# Patient Record
Sex: FEMALE | Race: WHITE | NOT HISPANIC OR LATINO
[De-identification: names, ages, dates, MRNs, and addresses within clinical notes are randomized per-mention and may not be internally consistent; named-entity substitution may affect disease eponyms.]

---

## 2017-04-28 ENCOUNTER — APPOINTMENT (OUTPATIENT)
Dept: OBGYN | Facility: CLINIC | Age: 47
End: 2017-04-28

## 2017-06-21 ENCOUNTER — APPOINTMENT (OUTPATIENT)
Dept: INTERNAL MEDICINE | Facility: CLINIC | Age: 47
End: 2017-06-21

## 2017-06-21 VITALS
HEIGHT: 61 IN | SYSTOLIC BLOOD PRESSURE: 130 MMHG | HEART RATE: 72 BPM | DIASTOLIC BLOOD PRESSURE: 90 MMHG | TEMPERATURE: 98.9 F

## 2017-06-22 ENCOUNTER — CHART COPY (OUTPATIENT)
Age: 47
End: 2017-06-22

## 2017-06-22 LAB
ALBUMIN SERPL ELPH-MCNC: 4 G/DL
ALP BLD-CCNC: 58 U/L
ALT SERPL-CCNC: 12 U/L
AMYLASE/CREAT SERPL: 170 U/L
ANION GAP SERPL CALC-SCNC: 18 MMOL/L
AST SERPL-CCNC: 18 U/L
BASOPHILS # BLD AUTO: 0.07 K/UL
BASOPHILS NFR BLD AUTO: 1 %
BILIRUB SERPL-MCNC: 0.2 MG/DL
BUN SERPL-MCNC: 16 MG/DL
CALCIUM SERPL-MCNC: 9.9 MG/DL
CHLORIDE SERPL-SCNC: 100 MMOL/L
CO2 SERPL-SCNC: 21 MMOL/L
CREAT SERPL-MCNC: 0.96 MG/DL
EOSINOPHIL # BLD AUTO: 0.47 K/UL
EOSINOPHIL NFR BLD AUTO: 6.5 %
ERYTHROCYTE [SEDIMENTATION RATE] IN BLOOD BY WESTERGREN METHOD: 37 MM/HR
GLUCOSE SERPL-MCNC: 87 MG/DL
HCT VFR BLD CALC: 38.4 %
HGB BLD-MCNC: 12.5 G/DL
IMM GRANULOCYTES NFR BLD AUTO: 0.6 %
LPL SERPL-CCNC: 35 U/L
LYMPHOCYTES # BLD AUTO: 2.07 K/UL
LYMPHOCYTES NFR BLD AUTO: 28.6 %
MAN DIFF?: NORMAL
MCHC RBC-ENTMCNC: 32.1 PG
MCHC RBC-ENTMCNC: 32.6 GM/DL
MCV RBC AUTO: 98.5 FL
MONOCYTES # BLD AUTO: 0.63 K/UL
MONOCYTES NFR BLD AUTO: 8.7 %
NEUTROPHILS # BLD AUTO: 3.96 K/UL
NEUTROPHILS NFR BLD AUTO: 54.6 %
PLATELET # BLD AUTO: 356 K/UL
POTASSIUM SERPL-SCNC: 4.3 MMOL/L
PROT SERPL-MCNC: 7 G/DL
RBC # BLD: 3.9 M/UL
RBC # FLD: 13.9 %
SODIUM SERPL-SCNC: 139 MMOL/L
WBC # FLD AUTO: 7.24 K/UL

## 2017-06-28 ENCOUNTER — APPOINTMENT (OUTPATIENT)
Dept: GASTROENTEROLOGY | Facility: CLINIC | Age: 47
End: 2017-06-28

## 2017-06-28 ENCOUNTER — OUTPATIENT (OUTPATIENT)
Dept: OUTPATIENT SERVICES | Facility: HOSPITAL | Age: 47
LOS: 1 days | End: 2017-06-28
Payer: COMMERCIAL

## 2017-06-28 ENCOUNTER — APPOINTMENT (OUTPATIENT)
Dept: ULTRASOUND IMAGING | Facility: CLINIC | Age: 47
End: 2017-06-28

## 2017-06-28 ENCOUNTER — APPOINTMENT (OUTPATIENT)
Dept: CT IMAGING | Facility: CLINIC | Age: 47
End: 2017-06-28

## 2017-06-28 VITALS — DIASTOLIC BLOOD PRESSURE: 90 MMHG | HEIGHT: 61 IN | SYSTOLIC BLOOD PRESSURE: 120 MMHG | TEMPERATURE: 98.3 F

## 2017-06-28 DIAGNOSIS — R10.9 UNSPECIFIED ABDOMINAL PAIN: ICD-10-CM

## 2017-06-28 PROCEDURE — 76700 US EXAM ABDOM COMPLETE: CPT

## 2017-06-30 ENCOUNTER — APPOINTMENT (OUTPATIENT)
Dept: INTERNAL MEDICINE | Facility: CLINIC | Age: 47
End: 2017-06-30

## 2017-07-28 ENCOUNTER — LABORATORY RESULT (OUTPATIENT)
Age: 47
End: 2017-07-28

## 2017-07-28 ENCOUNTER — APPOINTMENT (OUTPATIENT)
Dept: INTERNAL MEDICINE | Facility: CLINIC | Age: 47
End: 2017-07-28
Payer: COMMERCIAL

## 2017-07-28 VITALS
HEIGHT: 61 IN | DIASTOLIC BLOOD PRESSURE: 70 MMHG | TEMPERATURE: 97.6 F | BODY MASS INDEX: 22.66 KG/M2 | WEIGHT: 120 LBS | SYSTOLIC BLOOD PRESSURE: 110 MMHG

## 2017-07-28 VITALS — OXYGEN SATURATION: 97 % | HEART RATE: 72 BPM

## 2017-07-28 DIAGNOSIS — Z82.49 FAMILY HISTORY OF ISCHEMIC HEART DISEASE AND OTHER DISEASES OF THE CIRCULATORY SYSTEM: ICD-10-CM

## 2017-07-28 DIAGNOSIS — Z87.898 PERSONAL HISTORY OF OTHER SPECIFIED CONDITIONS: ICD-10-CM

## 2017-07-28 DIAGNOSIS — R10.13 EPIGASTRIC PAIN: ICD-10-CM

## 2017-07-28 DIAGNOSIS — Z87.09 PERSONAL HISTORY OF OTHER DISEASES OF THE RESPIRATORY SYSTEM: ICD-10-CM

## 2017-07-28 DIAGNOSIS — Z23 ENCOUNTER FOR IMMUNIZATION: ICD-10-CM

## 2017-07-28 DIAGNOSIS — R51 HEADACHE: ICD-10-CM

## 2017-07-28 DIAGNOSIS — Z78.9 OTHER SPECIFIED HEALTH STATUS: ICD-10-CM

## 2017-07-28 DIAGNOSIS — N20.0 CALCULUS OF KIDNEY: ICD-10-CM

## 2017-07-28 PROCEDURE — G0009: CPT

## 2017-07-28 PROCEDURE — 36415 COLL VENOUS BLD VENIPUNCTURE: CPT

## 2017-07-28 PROCEDURE — 90715 TDAP VACCINE 7 YRS/> IM: CPT

## 2017-07-28 PROCEDURE — 99396 PREV VISIT EST AGE 40-64: CPT | Mod: 25

## 2017-07-28 PROCEDURE — 94010 BREATHING CAPACITY TEST: CPT

## 2017-07-28 PROCEDURE — 90732 PPSV23 VACC 2 YRS+ SUBQ/IM: CPT

## 2017-07-28 PROCEDURE — 93000 ELECTROCARDIOGRAM COMPLETE: CPT

## 2017-07-28 PROCEDURE — 90472 IMMUNIZATION ADMIN EACH ADD: CPT

## 2017-07-31 LAB
AMYLASE/CREAT SERPL: 118 U/L
APPEARANCE: CLEAR
BACTERIA: NEGATIVE
BILIRUBIN URINE: NEGATIVE
BLOOD URINE: NEGATIVE
CHOLEST SERPL-MCNC: 225 MG/DL
CHOLEST/HDLC SERPL: 3.2 RATIO
COLOR: YELLOW
GLUCOSE QUALITATIVE U: NORMAL MG/DL
HDLC SERPL-MCNC: 70 MG/DL
KETONES URINE: NEGATIVE
LDLC SERPL CALC-MCNC: 139 MG/DL
LDLC SERPL DIRECT ASSAY-MCNC: 156 MG/DL
LEUKOCYTE ESTERASE URINE: NEGATIVE
MICROSCOPIC-UA: NORMAL
NITRITE URINE: NEGATIVE
PH URINE: 6
PROTEIN URINE: NEGATIVE MG/DL
RED BLOOD CELLS URINE: 4 /HPF
SPECIFIC GRAVITY URINE: 1.02
SQUAMOUS EPITHELIAL CELLS: 1 /HPF
T4 FREE SERPL-MCNC: 0.9 NG/DL
TRIGL SERPL-MCNC: 82 MG/DL
TSH SERPL-ACNC: 3.71 UIU/ML
UROBILINOGEN URINE: NORMAL MG/DL
WHITE BLOOD CELLS URINE: 2 /HPF

## 2017-08-16 ENCOUNTER — APPOINTMENT (OUTPATIENT)
Dept: GASTROENTEROLOGY | Facility: CLINIC | Age: 47
End: 2017-08-16
Payer: COMMERCIAL

## 2017-08-16 ENCOUNTER — LABORATORY RESULT (OUTPATIENT)
Age: 47
End: 2017-08-16

## 2017-08-16 DIAGNOSIS — R93.3 ABNORMAL FINDINGS ON DIAGNOSTIC IMAGING OF OTHER PARTS OF DIGESTIVE TRACT: ICD-10-CM

## 2017-08-16 PROCEDURE — 43202 ESOPHAGOSCOPY FLEX BIOPSY: CPT

## 2017-08-16 PROCEDURE — 81025 URINE PREGNANCY TEST: CPT

## 2017-08-29 ENCOUNTER — APPOINTMENT (OUTPATIENT)
Dept: OPHTHALMOLOGY | Facility: CLINIC | Age: 47
End: 2017-08-29
Payer: COMMERCIAL

## 2017-08-29 ENCOUNTER — APPOINTMENT (OUTPATIENT)
Dept: ALLERGY | Facility: CLINIC | Age: 47
End: 2017-08-29
Payer: COMMERCIAL

## 2017-08-29 VITALS
RESPIRATION RATE: 14 BRPM | HEART RATE: 72 BPM | HEIGHT: 61 IN | DIASTOLIC BLOOD PRESSURE: 70 MMHG | SYSTOLIC BLOOD PRESSURE: 110 MMHG | BODY MASS INDEX: 22.66 KG/M2 | WEIGHT: 120 LBS

## 2017-08-29 PROCEDURE — 95018 ALL TSTG PERQ&IQ DRUGS/BIOL: CPT

## 2017-08-29 PROCEDURE — 95004 PERQ TESTS W/ALRGNC XTRCS: CPT

## 2017-08-29 PROCEDURE — 99204 OFFICE O/P NEW MOD 45 MIN: CPT | Mod: 25

## 2017-08-29 PROCEDURE — 92004 COMPRE OPH EXAM NEW PT 1/>: CPT

## 2017-08-29 RX ORDER — OMEPRAZOLE 20 MG/1
20 CAPSULE, DELAYED RELEASE ORAL DAILY
Qty: 60 | Refills: 0 | Status: DISCONTINUED | COMMUNITY
Start: 2017-06-21 | End: 2017-08-29

## 2017-09-05 LAB
HCG UR QL: NEGATIVE
QUALITY CONTROL: YES

## 2017-09-12 ENCOUNTER — APPOINTMENT (OUTPATIENT)
Dept: MRI IMAGING | Facility: CLINIC | Age: 47
End: 2017-09-12
Payer: COMMERCIAL

## 2017-09-12 ENCOUNTER — OUTPATIENT (OUTPATIENT)
Dept: OUTPATIENT SERVICES | Facility: HOSPITAL | Age: 47
LOS: 1 days | End: 2017-09-12
Payer: COMMERCIAL

## 2017-09-12 DIAGNOSIS — R93.2 ABNORMAL FINDINGS ON DIAGNOSTIC IMAGING OF LIVER AND BILIARY TRACT: ICD-10-CM

## 2017-09-12 PROCEDURE — 74183 MRI ABD W/O CNTR FLWD CNTR: CPT

## 2017-09-12 PROCEDURE — A9585: CPT

## 2017-09-12 PROCEDURE — 74183 MRI ABD W/O CNTR FLWD CNTR: CPT | Mod: 26

## 2017-09-19 ENCOUNTER — MOBILE ON CALL (OUTPATIENT)
Age: 47
End: 2017-09-19

## 2017-09-19 ENCOUNTER — OTHER (OUTPATIENT)
Age: 47
End: 2017-09-19

## 2017-11-21 ENCOUNTER — APPOINTMENT (OUTPATIENT)
Dept: OTOLARYNGOLOGY | Facility: CLINIC | Age: 47
End: 2017-11-21
Payer: COMMERCIAL

## 2017-11-21 VITALS
HEIGHT: 61 IN | DIASTOLIC BLOOD PRESSURE: 80 MMHG | BODY MASS INDEX: 22.66 KG/M2 | HEART RATE: 84 BPM | SYSTOLIC BLOOD PRESSURE: 120 MMHG | WEIGHT: 120 LBS

## 2017-11-21 DIAGNOSIS — J34.2 DEVIATED NASAL SEPTUM: ICD-10-CM

## 2017-11-21 DIAGNOSIS — J30.2 OTHER SEASONAL ALLERGIC RHINITIS: ICD-10-CM

## 2017-11-21 PROCEDURE — 95004 PERQ TESTS W/ALRGNC XTRCS: CPT

## 2017-11-21 PROCEDURE — 99204 OFFICE O/P NEW MOD 45 MIN: CPT | Mod: 25

## 2017-11-21 PROCEDURE — 31231 NASAL ENDOSCOPY DX: CPT

## 2017-12-13 ENCOUNTER — APPOINTMENT (OUTPATIENT)
Dept: INTERNAL MEDICINE | Facility: CLINIC | Age: 47
End: 2017-12-13

## 2017-12-18 ENCOUNTER — APPOINTMENT (OUTPATIENT)
Dept: OTOLARYNGOLOGY | Facility: CLINIC | Age: 47
End: 2017-12-18

## 2018-01-05 ENCOUNTER — OTHER (OUTPATIENT)
Age: 48
End: 2018-01-05

## 2018-01-07 ENCOUNTER — TRANSCRIPTION ENCOUNTER (OUTPATIENT)
Age: 48
End: 2018-01-07

## 2018-01-09 ENCOUNTER — APPOINTMENT (OUTPATIENT)
Dept: OTOLARYNGOLOGY | Facility: CLINIC | Age: 48
End: 2018-01-09
Payer: COMMERCIAL

## 2018-01-09 ENCOUNTER — APPOINTMENT (OUTPATIENT)
Dept: OTOLARYNGOLOGY | Facility: CLINIC | Age: 48
End: 2018-01-09

## 2018-01-09 VITALS
SYSTOLIC BLOOD PRESSURE: 120 MMHG | HEIGHT: 61 IN | DIASTOLIC BLOOD PRESSURE: 75 MMHG | WEIGHT: 120 LBS | HEART RATE: 78 BPM | BODY MASS INDEX: 22.66 KG/M2

## 2018-01-09 DIAGNOSIS — J39.2 OTHER DISEASES OF PHARYNX: ICD-10-CM

## 2018-01-09 DIAGNOSIS — R68.89 OTHER GENERAL SYMPTOMS AND SIGNS: ICD-10-CM

## 2018-01-09 PROCEDURE — 99214 OFFICE O/P EST MOD 30 MIN: CPT | Mod: 25

## 2018-01-09 PROCEDURE — 31575 DIAGNOSTIC LARYNGOSCOPY: CPT

## 2018-01-29 ENCOUNTER — OTHER (OUTPATIENT)
Age: 48
End: 2018-01-29

## 2018-02-02 ENCOUNTER — APPOINTMENT (OUTPATIENT)
Dept: OBGYN | Facility: CLINIC | Age: 48
End: 2018-02-02

## 2018-02-12 ENCOUNTER — MOBILE ON CALL (OUTPATIENT)
Age: 48
End: 2018-02-12

## 2018-02-13 ENCOUNTER — OUTPATIENT (OUTPATIENT)
Dept: OUTPATIENT SERVICES | Facility: HOSPITAL | Age: 48
LOS: 1 days | End: 2018-02-13
Payer: COMMERCIAL

## 2018-02-13 ENCOUNTER — APPOINTMENT (OUTPATIENT)
Dept: MRI IMAGING | Facility: CLINIC | Age: 48
End: 2018-02-13
Payer: COMMERCIAL

## 2018-02-13 DIAGNOSIS — Z00.8 ENCOUNTER FOR OTHER GENERAL EXAMINATION: ICD-10-CM

## 2018-02-13 PROCEDURE — A9585: CPT

## 2018-02-13 PROCEDURE — 74183 MRI ABD W/O CNTR FLWD CNTR: CPT | Mod: 26

## 2018-02-13 PROCEDURE — 74183 MRI ABD W/O CNTR FLWD CNTR: CPT

## 2018-03-28 ENCOUNTER — APPOINTMENT (OUTPATIENT)
Dept: OBGYN | Facility: CLINIC | Age: 48
End: 2018-03-28
Payer: COMMERCIAL

## 2018-03-28 PROCEDURE — 99214 OFFICE O/P EST MOD 30 MIN: CPT

## 2018-04-04 ENCOUNTER — APPOINTMENT (OUTPATIENT)
Dept: COLORECTAL SURGERY | Facility: CLINIC | Age: 48
End: 2018-04-04
Payer: COMMERCIAL

## 2018-04-04 VITALS
HEART RATE: 56 BPM | SYSTOLIC BLOOD PRESSURE: 120 MMHG | BODY MASS INDEX: 22.66 KG/M2 | HEIGHT: 61 IN | WEIGHT: 120 LBS | DIASTOLIC BLOOD PRESSURE: 80 MMHG | RESPIRATION RATE: 12 BRPM

## 2018-04-04 PROCEDURE — 99203 OFFICE O/P NEW LOW 30 MIN: CPT | Mod: 25

## 2018-04-04 PROCEDURE — 45300 PROCTOSIGMOIDOSCOPY DX: CPT

## 2018-04-04 RX ORDER — ASPIRIN 81 MG
81 TABLET, DELAYED RELEASE (ENTERIC COATED) ORAL
Refills: 0 | Status: ACTIVE | COMMUNITY

## 2018-04-04 RX ORDER — RANITIDINE 150 MG/1
150 TABLET ORAL
Qty: 30 | Refills: 3 | Status: DISCONTINUED | COMMUNITY
Start: 2017-06-28 | End: 2018-04-04

## 2018-04-04 RX ORDER — HYOSCYAMINE SULFATE 0.38 MG/1
0.38 TABLET, EXTENDED RELEASE ORAL
Qty: 120 | Refills: 1 | Status: DISCONTINUED | COMMUNITY
Start: 2017-06-28 | End: 2018-04-04

## 2018-04-04 RX ORDER — BIOTIN 10 MG
TABLET ORAL
Refills: 0 | Status: ACTIVE | COMMUNITY

## 2018-04-10 ENCOUNTER — APPOINTMENT (OUTPATIENT)
Dept: GASTROENTEROLOGY | Facility: CLINIC | Age: 48
End: 2018-04-10
Payer: COMMERCIAL

## 2018-04-10 VITALS — HEIGHT: 61 IN | BODY MASS INDEX: 22.66 KG/M2 | WEIGHT: 120 LBS

## 2018-04-10 DIAGNOSIS — R13.12 DYSPHAGIA, OROPHARYNGEAL PHASE: ICD-10-CM

## 2018-04-10 DIAGNOSIS — Z87.19 PERSONAL HISTORY OF OTHER DISEASES OF THE DIGESTIVE SYSTEM: ICD-10-CM

## 2018-04-10 PROCEDURE — 99204 OFFICE O/P NEW MOD 45 MIN: CPT

## 2018-04-18 ENCOUNTER — APPOINTMENT (OUTPATIENT)
Dept: COLORECTAL SURGERY | Facility: CLINIC | Age: 48
End: 2018-04-18
Payer: COMMERCIAL

## 2018-04-18 PROCEDURE — 46221 LIGATION OF HEMORRHOID(S): CPT

## 2018-04-24 ENCOUNTER — TRANSCRIPTION ENCOUNTER (OUTPATIENT)
Age: 48
End: 2018-04-24

## 2018-05-02 ENCOUNTER — OUTPATIENT (OUTPATIENT)
Dept: OUTPATIENT SERVICES | Facility: HOSPITAL | Age: 48
LOS: 1 days | End: 2018-05-02
Payer: COMMERCIAL

## 2018-05-02 ENCOUNTER — RESULT REVIEW (OUTPATIENT)
Age: 48
End: 2018-05-02

## 2018-05-02 ENCOUNTER — APPOINTMENT (OUTPATIENT)
Dept: GASTROENTEROLOGY | Facility: HOSPITAL | Age: 48
End: 2018-05-02

## 2018-05-02 DIAGNOSIS — K62.5 HEMORRHAGE OF ANUS AND RECTUM: ICD-10-CM

## 2018-05-02 LAB
HCG SERPL QL: NEGATIVE — SIGNIFICANT CHANGE UP
HCG SERPL-ACNC: <2 MIU/ML — LOW (ref 5–24)

## 2018-05-02 PROCEDURE — 84703 CHORIONIC GONADOTROPIN ASSAY: CPT

## 2018-05-02 PROCEDURE — 45380 COLONOSCOPY AND BIOPSY: CPT

## 2018-05-02 PROCEDURE — 84702 CHORIONIC GONADOTROPIN TEST: CPT

## 2018-05-04 ENCOUNTER — APPOINTMENT (OUTPATIENT)
Dept: NEUROLOGY | Facility: CLINIC | Age: 48
End: 2018-05-04

## 2018-05-09 ENCOUNTER — APPOINTMENT (OUTPATIENT)
Dept: COLORECTAL SURGERY | Facility: CLINIC | Age: 48
End: 2018-05-09
Payer: COMMERCIAL

## 2018-05-09 PROCEDURE — 46221 LIGATION OF HEMORRHOID(S): CPT

## 2018-05-16 ENCOUNTER — APPOINTMENT (OUTPATIENT)
Dept: OBGYN | Facility: CLINIC | Age: 48
End: 2018-05-16
Payer: COMMERCIAL

## 2018-05-16 ENCOUNTER — RESULT REVIEW (OUTPATIENT)
Age: 48
End: 2018-05-16

## 2018-05-16 PROCEDURE — 99396 PREV VISIT EST AGE 40-64: CPT

## 2018-05-30 ENCOUNTER — APPOINTMENT (OUTPATIENT)
Dept: COLORECTAL SURGERY | Facility: CLINIC | Age: 48
End: 2018-05-30
Payer: COMMERCIAL

## 2018-05-30 PROCEDURE — 46221 LIGATION OF HEMORRHOID(S): CPT

## 2018-05-30 PROCEDURE — 99212 OFFICE O/P EST SF 10 MIN: CPT | Mod: 25

## 2018-06-01 ENCOUNTER — LABORATORY RESULT (OUTPATIENT)
Age: 48
End: 2018-06-01

## 2018-06-01 ENCOUNTER — APPOINTMENT (OUTPATIENT)
Dept: INTERNAL MEDICINE | Facility: CLINIC | Age: 48
End: 2018-06-01
Payer: COMMERCIAL

## 2018-06-01 VITALS
BODY MASS INDEX: 23.79 KG/M2 | WEIGHT: 126 LBS | DIASTOLIC BLOOD PRESSURE: 84 MMHG | HEIGHT: 61 IN | OXYGEN SATURATION: 97 % | HEART RATE: 78 BPM | SYSTOLIC BLOOD PRESSURE: 138 MMHG

## 2018-06-01 DIAGNOSIS — F41.8 OTHER SPECIFIED ANXIETY DISORDERS: ICD-10-CM

## 2018-06-01 DIAGNOSIS — R93.2 ABNORMAL FINDINGS ON DIAGNOSTIC IMAGING OF LIVER AND BILIARY TRACT: ICD-10-CM

## 2018-06-01 DIAGNOSIS — Z01.818 ENCOUNTER FOR OTHER PREPROCEDURAL EXAMINATION: ICD-10-CM

## 2018-06-01 PROCEDURE — 93000 ELECTROCARDIOGRAM COMPLETE: CPT

## 2018-06-01 PROCEDURE — 99204 OFFICE O/P NEW MOD 45 MIN: CPT | Mod: 25

## 2018-06-01 RX ORDER — POLYETHYLENE GLYCOL-3350 AND ELECTROLYTES 236; 6.74; 5.86; 2.97; 22.74 G/274.31G; G/274.31G; G/274.31G; G/274.31G; G/274.31G
236 POWDER, FOR SOLUTION ORAL
Qty: 4000 | Refills: 0 | Status: DISCONTINUED | COMMUNITY
Start: 2018-04-10 | End: 2018-06-01

## 2018-06-01 RX ORDER — LEVONORGESTREL AND ETHINYL ESTRADIOL AND ETHINYL ESTRADIOL 150-30(84)
0.15-0.03 &0.01 KIT ORAL
Qty: 91 | Refills: 0 | Status: DISCONTINUED | COMMUNITY
Start: 2018-04-02 | End: 2018-06-01

## 2018-06-01 RX ORDER — RIZATRIPTAN BENZOATE 5 MG/1
5 TABLET ORAL
Qty: 1 | Refills: 0 | Status: DISCONTINUED | COMMUNITY
Start: 2017-07-28 | End: 2018-06-01

## 2018-06-01 RX ORDER — FLUTICASONE PROPIONATE 50 UG/1
50 SPRAY, METERED NASAL DAILY
Qty: 16 | Refills: 4 | Status: DISCONTINUED | COMMUNITY
Start: 2017-11-21 | End: 2018-06-01

## 2018-06-04 LAB
ALBUMIN SERPL ELPH-MCNC: 4.2 G/DL
ALP BLD-CCNC: 54 U/L
ALT SERPL-CCNC: 20 U/L
ANION GAP SERPL CALC-SCNC: 15 MMOL/L
APPEARANCE: CLEAR
APTT BLD: 29.8 SEC
AST SERPL-CCNC: 23 U/L
BASOPHILS # BLD AUTO: 0.06 K/UL
BASOPHILS NFR BLD AUTO: 0.7 %
BILIRUB SERPL-MCNC: 0.8 MG/DL
BILIRUBIN URINE: NEGATIVE
BLOOD URINE: NEGATIVE
BUN SERPL-MCNC: 10 MG/DL
CALCIUM SERPL-MCNC: 10 MG/DL
CHLORIDE SERPL-SCNC: 98 MMOL/L
CO2 SERPL-SCNC: 24 MMOL/L
COLOR: YELLOW
CREAT SERPL-MCNC: 1 MG/DL
EOSINOPHIL # BLD AUTO: 0.19 K/UL
EOSINOPHIL NFR BLD AUTO: 2.3 %
GLUCOSE QUALITATIVE U: NEGATIVE MG/DL
GLUCOSE SERPL-MCNC: 73 MG/DL
HBV CORE IGG+IGM SER QL: NONREACTIVE
HBV SURFACE AB SER QL: REACTIVE
HBV SURFACE AG SER QL: NONREACTIVE
HCG SERPL-MCNC: <1 MIU/ML
HCT VFR BLD CALC: 38.6 %
HCV AB SER QL: NONREACTIVE
HCV S/CO RATIO: 0.48 S/CO
HEPATITIS A IGG ANTIBODY: REACTIVE
HGB BLD-MCNC: 12.3 G/DL
HIV1+2 AB SPEC QL IA.RAPID: NONREACTIVE
IMM GRANULOCYTES NFR BLD AUTO: 0.2 %
INR PPP: 1 RATIO
KETONES URINE: NEGATIVE
LEUKOCYTE ESTERASE URINE: ABNORMAL
LYMPHOCYTES # BLD AUTO: 1.88 K/UL
LYMPHOCYTES NFR BLD AUTO: 22.8 %
MAN DIFF?: NORMAL
MCHC RBC-ENTMCNC: 31.7 PG
MCHC RBC-ENTMCNC: 31.9 GM/DL
MCV RBC AUTO: 99.5 FL
MONOCYTES # BLD AUTO: 0.66 K/UL
MONOCYTES NFR BLD AUTO: 8 %
NEUTROPHILS # BLD AUTO: 5.42 K/UL
NEUTROPHILS NFR BLD AUTO: 66 %
NITRITE URINE: NEGATIVE
PH URINE: 6
PLATELET # BLD AUTO: 284 K/UL
POTASSIUM SERPL-SCNC: 4 MMOL/L
PROT SERPL-MCNC: 7.4 G/DL
PROTEIN URINE: NEGATIVE MG/DL
PT BLD: 11.3 SEC
RBC # BLD: 3.88 M/UL
RBC # FLD: 13.8 %
SODIUM SERPL-SCNC: 137 MMOL/L
SPECIFIC GRAVITY URINE: 1.01
UROBILINOGEN URINE: NEGATIVE MG/DL
WBC # FLD AUTO: 8.23 K/UL

## 2018-06-05 ENCOUNTER — APPOINTMENT (OUTPATIENT)
Dept: DERMATOLOGY | Facility: CLINIC | Age: 48
End: 2018-06-05
Payer: COMMERCIAL

## 2018-06-05 VITALS
HEIGHT: 61 IN | BODY MASS INDEX: 23.79 KG/M2 | WEIGHT: 126 LBS | DIASTOLIC BLOOD PRESSURE: 72 MMHG | SYSTOLIC BLOOD PRESSURE: 108 MMHG

## 2018-06-05 DIAGNOSIS — Z87.09 PERSONAL HISTORY OF OTHER DISEASES OF THE RESPIRATORY SYSTEM: ICD-10-CM

## 2018-06-05 DIAGNOSIS — Z91.89 OTHER SPECIFIED PERSONAL RISK FACTORS, NOT ELSEWHERE CLASSIFIED: ICD-10-CM

## 2018-06-05 PROCEDURE — 99203 OFFICE O/P NEW LOW 30 MIN: CPT

## 2018-06-11 ENCOUNTER — FORM ENCOUNTER (OUTPATIENT)
Age: 48
End: 2018-06-11

## 2018-06-12 ENCOUNTER — APPOINTMENT (OUTPATIENT)
Dept: RADIOLOGY | Facility: IMAGING CENTER | Age: 48
End: 2018-06-12
Payer: COMMERCIAL

## 2018-06-12 ENCOUNTER — OUTPATIENT (OUTPATIENT)
Dept: OUTPATIENT SERVICES | Facility: HOSPITAL | Age: 48
LOS: 1 days | End: 2018-06-12
Payer: COMMERCIAL

## 2018-06-12 DIAGNOSIS — J45.909 UNSPECIFIED ASTHMA, UNCOMPLICATED: ICD-10-CM

## 2018-06-12 DIAGNOSIS — R00.2 PALPITATIONS: ICD-10-CM

## 2018-06-12 PROCEDURE — 71046 X-RAY EXAM CHEST 2 VIEWS: CPT

## 2018-06-12 PROCEDURE — 71046 X-RAY EXAM CHEST 2 VIEWS: CPT | Mod: 26

## 2018-06-13 NOTE — RESULTS/DATA
[ECG Reviewed] : reviewed [No Acute Ischemia] : No acute ischemia [NSR] : normal sinus rhythm [Normal QRS] : the QRS is normal [Normal ST Segments] : the ST segments are normal [No Interval Change] : no interval change [] : results reviewed [de-identified] : WNL [de-identified] : WNL [de-identified] : WNL [de-identified] : 6/12/18 CXR was unremarkable [FreeTextEntry4] : overall EKG is similar to prior in 2017 [de-identified] : Hep A and Hep B testing show immunity and no evidence of active infection. Hep C ab negative. UA with asymptomatic bacteruria which does not require treatment since pt is asymptomatic.

## 2018-06-13 NOTE — REVIEW OF SYSTEMS
[Anxiety] : anxiety [Depression] : depression [Fever] : no fever [Chills] : no chills [Pain] : no pain [Redness] : no redness [Sore Throat] : no sore throat [Chest Pain] : no chest pain [Palpitations] : no palpitations [Leg Claudication] : no leg claudication [Lower Ext Edema] : no lower extremity edema [Shortness Of Breath] : no shortness of breath [Wheezing] : no wheezing [Cough] : no cough [Dyspnea on Exertion] : no dyspnea on exertion [Abdominal Pain] : no abdominal pain [Melena] : no melena [Dysuria] : no dysuria [Hematuria] : no hematuria [Joint Swelling] : no joint swelling [Skin Rash] : no skin rash [Fainting] : no fainting [Easy Bleeding] : no easy bleeding [Easy Bruising] : no easy bruising [de-identified] : Patient denied any pain/lump/bump/nipple discharge in either breast [de-identified] : well controlled PHQ 6 points pt is under the care of Psychiatry

## 2018-06-13 NOTE — PLAN
[FreeTextEntry1] : RTC 3-6 months or sooner prn for CPE advised\par \par Paper progress note reviewed and it was unremarkable. ROS updated here to reflect active complaints.\par -pt noted fatigue/night sweats - which are chronic. she feels a bit more fatigued lately. her exam and ROS are otherwise unremarkable. she has a hx of allergies.\par -pt noted headaches/muscle aches but did not have any issues at this time\par -postnasal drip noted by pt but oropharynx exam was unremarkable\par -rectal bleeding/hemorrhoids/swallowing/irritable bowl are managed by GI. she had recent hemorrhoidectomy with resolution of her rectal bleeding.

## 2018-06-13 NOTE — HISTORY OF PRESENT ILLNESS
[Previous Adverse Anesthesia Reaction] : previous adverse anesthesia reaction [Family Member with Adverse Anesthesia Reaction/Sudden Death] : family member with adverse anesthesia reaction/sudden death [Asthma] : asthma [( Patient denies any chest pain, claudication, dyspnea on exertion, orthopnea, palpitations or syncope )] : Patient denies any chest pain, claudication, dyspnea on exertion, orthopnea, palpitations or syncope. [Good (7-10 METs)] : Good (7-10 METs) [Anti-Platelet Agents: _____] : Anti-Platelet Agents: [unfilled] [NSAIDs: _____] : NSAIDs: [unfilled] [Herbal Supplements: _____] : Herbal Supplements: [unfilled] [Coronary Artery Disease] : no coronary artery disease [Diabetes] : no diabetes [Sleep Apnea] : no sleep apnea [COPD] : no COPD [FreeTextEntry1] : Plastic surgery - Rhinoplasty, 4 lid blepharoplasty, lateral brow lift, gluteal augmentation [FreeTextEntry2] : 8/14/18 [FreeTextEntry3] : Dr Nakul Cochran -[phone] [FreeTextEntry4] : \remy Pt is going for cosmetic surgery. Pt has had other work done including tummy tuck, liposuction, breast implants. She has no questions about it. \par -d/w pt that any surgical procedure carries risks and to discuss the particular risks with the surgeon\remy -pt had no questions for me about it today\remy -pt is aware she is doing the pre-op eval 30 days prior to her procedure and pt reports her surgeon is "OK" with this [FreeTextEntry7] : EKG 2017 wnl and unremarkable [FreeTextEntry8] : pt states she does 1 hour of anuj class which is intense without exertional CP/SOB or issues

## 2018-06-13 NOTE — ASSESSMENT
[High Risk Surgery - Intraperitoneal, Intrathoracic or Supringuinal Vascular Procedures] : High Risk Surgery - Intraperitoneal, Intrathoracic or Supringuinal Vascular Procedures - No (0) [Ischemic Heart Disease] : Ischemic Heart Disease - No (0) [Congestive Heart Failure] : Congestive Heart Failure - No (0) [Prior Cerebrovascular Accident or TIA] : Prior Cerebrovascular Accident or TIA - No (0) [Creatinine >= 2mg/dL (1 Point)] : Creatinine >= 2mg/dL - No (0) [Insulin-dependent Diabetic (1 Point)] : Insulin-dependent Diabetic - No (0) [0] : 0 , RCRI Class: I, Risk of Post-Op Cardiac Complications: 0.4%, Procedure Risk: Low-Risk [Patient Optimized for Surgery] : Patient optimized for surgery [Echocardiogram] : echocardiogram [Modify medications prior to procedure] : Modify medications prior to procedure [As per surgery] : as per surgery [FreeTextEntry4] : advised pt that she is low risk for an intermediate risk procedure and that nobody is zero risk. [FreeTextEntry7] : advised NO NSAIDs/ASA/herbals/vitamins 7 days prior to surgery and ONLY tylenol PRN any pains and other meds to be continued

## 2018-06-13 NOTE — PHYSICAL EXAM
[No Acute Distress] : no acute distress [Well Developed] : well developed [Well-Appearing] : well-appearing [Normal Sclera/Conjunctiva] : normal sclera/conjunctiva [PERRL] : pupils equal round and reactive to light [EOMI] : extraocular movements intact [Normal Oropharynx] : the oropharynx was normal [Supple] : supple [No Lymphadenopathy] : no lymphadenopathy [Thyroid Normal, No Nodules] : the thyroid was normal and there were no nodules present [No Respiratory Distress] : no respiratory distress  [Clear to Auscultation] : lungs were clear to auscultation bilaterally [No Accessory Muscle Use] : no accessory muscle use [Normal Rate] : normal rate  [Regular Rhythm] : with a regular rhythm [Normal S1, S2] : normal S1 and S2 [No Murmur] : no murmur heard [No Edema] : there was no peripheral edema [No Axillary Lymphadenopathy] : no axillary lymphadenopathy [Soft] : abdomen soft [Non Tender] : non-tender [Non-distended] : non-distended [No HSM] : no HSM [Normal Bowel Sounds] : normal bowel sounds [Normal Supraclavicular Nodes] : no supraclavicular lymphadenopathy [Normal Axillary Nodes] : no axillary lymphadenopathy [Normal Posterior Cervical Nodes] : no posterior cervical lymphadenopathy [Normal Anterior Cervical Nodes] : no anterior cervical lymphadenopathy [Normal Inguinal Nodes] : no inguinal lymphadenopathy [No CVA Tenderness] : no CVA  tenderness [No Spinal Tenderness] : no spinal tenderness [No Joint Swelling] : no joint swelling [No Focal Deficits] : no focal deficits [Speech Grossly Normal] : speech grossly normal [Normal Affect] : the affect was normal [Alert and Oriented x3] : oriented to person, place, and time [Normal Mood] : the mood was normal [Normal Insight/Judgement] : insight and judgment were intact [de-identified] : No calf tenderness bilaterally. Radial pulses +2 bilaterally  [de-identified] : normal turgor

## 2018-06-13 NOTE — COUNSELING
[Healthy eating counseling provided] : healthy eating [None] : None [Good understanding] : Patient has a good understanding of lifestyle changes and the steps needed to achieve self management goals [de-identified] : \par \par Pt advised to call us/come here for a sick visit/or go to urgent care if she is ill. pt advised to go to ED if emergent issues. pt is to call us to discuss results of all lab testing and to discuss any appropriate followup. Pt advised to alert us if she does not receive a phone call or letter with lab test results within 10 days  of today's visit.\par

## 2018-06-26 ENCOUNTER — APPOINTMENT (OUTPATIENT)
Dept: GASTROENTEROLOGY | Facility: CLINIC | Age: 48
End: 2018-06-26
Payer: COMMERCIAL

## 2018-06-26 VITALS
HEIGHT: 61 IN | DIASTOLIC BLOOD PRESSURE: 94 MMHG | BODY MASS INDEX: 23.79 KG/M2 | SYSTOLIC BLOOD PRESSURE: 124 MMHG | HEART RATE: 78 BPM | OXYGEN SATURATION: 98 % | WEIGHT: 126 LBS

## 2018-06-26 DIAGNOSIS — Z86.010 PERSONAL HISTORY OF COLONIC POLYPS: ICD-10-CM

## 2018-06-26 PROCEDURE — 99214 OFFICE O/P EST MOD 30 MIN: CPT

## 2018-06-27 ENCOUNTER — OUTPATIENT (OUTPATIENT)
Dept: OUTPATIENT SERVICES | Facility: HOSPITAL | Age: 48
LOS: 1 days | End: 2018-06-27
Payer: COMMERCIAL

## 2018-06-27 ENCOUNTER — APPOINTMENT (OUTPATIENT)
Dept: CV DIAGNOSITCS | Facility: HOSPITAL | Age: 48
End: 2018-06-27

## 2018-06-27 DIAGNOSIS — R00.2 PALPITATIONS: ICD-10-CM

## 2018-06-27 PROCEDURE — 93306 TTE W/DOPPLER COMPLETE: CPT | Mod: 26

## 2018-06-27 PROCEDURE — 93306 TTE W/DOPPLER COMPLETE: CPT

## 2018-07-03 ENCOUNTER — APPOINTMENT (OUTPATIENT)
Dept: OPHTHALMOLOGY | Facility: CLINIC | Age: 48
End: 2018-07-03
Payer: COMMERCIAL

## 2018-07-03 ENCOUNTER — OTHER (OUTPATIENT)
Age: 48
End: 2018-07-03

## 2018-07-03 DIAGNOSIS — H04.123 DRY EYE SYNDROME OF BILATERAL LACRIMAL GLANDS: ICD-10-CM

## 2018-07-03 PROCEDURE — 92014 COMPRE OPH EXAM EST PT 1/>: CPT

## 2018-07-25 ENCOUNTER — OUTPATIENT (OUTPATIENT)
Dept: OUTPATIENT SERVICES | Facility: HOSPITAL | Age: 48
LOS: 1 days | End: 2018-07-25
Payer: COMMERCIAL

## 2018-07-25 ENCOUNTER — APPOINTMENT (OUTPATIENT)
Dept: MRI IMAGING | Facility: CLINIC | Age: 48
End: 2018-07-25
Payer: COMMERCIAL

## 2018-07-25 DIAGNOSIS — K76.89 OTHER SPECIFIED DISEASES OF LIVER: ICD-10-CM

## 2018-07-25 PROCEDURE — 74183 MRI ABD W/O CNTR FLWD CNTR: CPT | Mod: 26

## 2018-07-25 PROCEDURE — A9585: CPT

## 2018-07-25 PROCEDURE — 74183 MRI ABD W/O CNTR FLWD CNTR: CPT

## 2018-07-27 ENCOUNTER — RESULT REVIEW (OUTPATIENT)
Age: 48
End: 2018-07-27

## 2018-07-31 ENCOUNTER — RESULT REVIEW (OUTPATIENT)
Age: 48
End: 2018-07-31

## 2018-08-03 DIAGNOSIS — I35.9 NONRHEUMATIC AORTIC VALVE DISORDER, UNSPECIFIED: ICD-10-CM

## 2018-09-21 ENCOUNTER — APPOINTMENT (OUTPATIENT)
Dept: OBGYN | Facility: CLINIC | Age: 48
End: 2018-09-21

## 2018-10-10 ENCOUNTER — APPOINTMENT (OUTPATIENT)
Dept: OBGYN | Facility: CLINIC | Age: 48
End: 2018-10-10
Payer: COMMERCIAL

## 2018-10-10 PROCEDURE — 99213 OFFICE O/P EST LOW 20 MIN: CPT

## 2018-10-15 ENCOUNTER — APPOINTMENT (OUTPATIENT)
Dept: INTERNAL MEDICINE | Facility: CLINIC | Age: 48
End: 2018-10-15
Payer: COMMERCIAL

## 2018-10-15 VITALS
OXYGEN SATURATION: 98 % | WEIGHT: 128 LBS | DIASTOLIC BLOOD PRESSURE: 80 MMHG | SYSTOLIC BLOOD PRESSURE: 134 MMHG | HEART RATE: 82 BPM | BODY MASS INDEX: 24.19 KG/M2

## 2018-10-15 PROCEDURE — 99213 OFFICE O/P EST LOW 20 MIN: CPT

## 2018-10-16 VITALS — SYSTOLIC BLOOD PRESSURE: 120 MMHG | DIASTOLIC BLOOD PRESSURE: 70 MMHG

## 2018-10-18 NOTE — HISTORY OF PRESENT ILLNESS
[FreeTextEntry8] : Pt was at the GYN office last Wed in which her BP was 155/99\par -home BP has been around 140s/90s or sometimes in the 130s as pt has been checking it\par -pt reports no symptoms and states her BP has always been normal in the past\par -pt denied starting any meds and reports her psychiatrist is trying to cut down on the seroquel\par \par Pt reports her cosmetic surgery from earlier this year went well without issues.\par

## 2018-10-18 NOTE — ADDENDUM
[FreeTextEntry1] : I just spoke to the pt. She was asking if she may take metformin for weight loss. D/w pt that I do see a 10lb weight gain over the past 5 years or so however given her normal BMI medication for weight loss is not indicated and advised diet/exercise/lifestyle modification.

## 2018-10-18 NOTE — PHYSICAL EXAM
[No Acute Distress] : no acute distress [Supple] : supple [No Respiratory Distress] : no respiratory distress  [Clear to Auscultation] : lungs were clear to auscultation bilaterally [No Accessory Muscle Use] : no accessory muscle use [Normal Rate] : normal rate  [Regular Rhythm] : with a regular rhythm [Normal S1, S2] : normal S1 and S2 [No Murmur] : no murmur heard [No Edema] : there was no peripheral edema [No Focal Deficits] : no focal deficits [Speech Grossly Normal] : speech grossly normal [Normal Affect] : the affect was normal [Alert and Oriented x3] : oriented to person, place, and time [Normal Mood] : the mood was normal [Normal Insight/Judgement] : insight and judgment were intact [Acne] : no acne

## 2018-10-23 ENCOUNTER — APPOINTMENT (OUTPATIENT)
Dept: ORTHOPEDIC SURGERY | Facility: CLINIC | Age: 48
End: 2018-10-23
Payer: COMMERCIAL

## 2018-10-23 VITALS — SYSTOLIC BLOOD PRESSURE: 143 MMHG | HEART RATE: 71 BPM | DIASTOLIC BLOOD PRESSURE: 81 MMHG | HEIGHT: 60.75 IN

## 2018-10-23 DIAGNOSIS — Z78.9 OTHER SPECIFIED HEALTH STATUS: ICD-10-CM

## 2018-10-23 DIAGNOSIS — Z82.61 FAMILY HISTORY OF ARTHRITIS: ICD-10-CM

## 2018-10-23 DIAGNOSIS — Z80.9 FAMILY HISTORY OF MALIGNANT NEOPLASM, UNSPECIFIED: ICD-10-CM

## 2018-10-23 DIAGNOSIS — G89.29 PAIN IN RIGHT KNEE: ICD-10-CM

## 2018-10-23 DIAGNOSIS — M25.561 PAIN IN RIGHT KNEE: ICD-10-CM

## 2018-10-23 DIAGNOSIS — Z87.39 PERSONAL HISTORY OF OTHER DISEASES OF THE MUSCULOSKELETAL SYSTEM AND CONNECTIVE TISSUE: ICD-10-CM

## 2018-10-23 PROCEDURE — 99204 OFFICE O/P NEW MOD 45 MIN: CPT

## 2018-10-23 PROCEDURE — 73564 X-RAY EXAM KNEE 4 OR MORE: CPT | Mod: RT

## 2018-11-14 ENCOUNTER — APPOINTMENT (OUTPATIENT)
Dept: ORTHOPEDIC SURGERY | Facility: CLINIC | Age: 48
End: 2018-11-14
Payer: COMMERCIAL

## 2018-11-14 VITALS — HEART RATE: 76 BPM | DIASTOLIC BLOOD PRESSURE: 92 MMHG | SYSTOLIC BLOOD PRESSURE: 146 MMHG

## 2018-11-14 DIAGNOSIS — M89.8X1 OTHER SPECIFIED DISORDERS OF BONE, SHOULDER: ICD-10-CM

## 2018-11-14 PROCEDURE — 73030 X-RAY EXAM OF SHOULDER: CPT | Mod: LT

## 2018-11-14 PROCEDURE — 99214 OFFICE O/P EST MOD 30 MIN: CPT

## 2018-11-15 PROBLEM — M89.8X1 PERISCAPULAR PAIN: Status: ACTIVE | Noted: 2018-11-15

## 2018-11-28 ENCOUNTER — APPOINTMENT (OUTPATIENT)
Dept: ORTHOPEDIC SURGERY | Facility: CLINIC | Age: 48
End: 2018-11-28
Payer: COMMERCIAL

## 2018-11-28 VITALS
WEIGHT: 128 LBS | HEART RATE: 67 BPM | HEIGHT: 60.75 IN | SYSTOLIC BLOOD PRESSURE: 131 MMHG | BODY MASS INDEX: 24.48 KG/M2 | DIASTOLIC BLOOD PRESSURE: 85 MMHG

## 2018-11-28 DIAGNOSIS — M40.209 UNSPECIFIED KYPHOSIS, SITE UNSPECIFIED: ICD-10-CM

## 2018-11-28 PROCEDURE — 72040 X-RAY EXAM NECK SPINE 2-3 VW: CPT

## 2018-11-28 PROCEDURE — 99214 OFFICE O/P EST MOD 30 MIN: CPT

## 2019-01-22 ENCOUNTER — EMERGENCY (EMERGENCY)
Facility: HOSPITAL | Age: 49
LOS: 1 days | Discharge: HOME | End: 2019-01-22
Attending: EMERGENCY MEDICINE | Admitting: EMERGENCY MEDICINE
Payer: COMMERCIAL

## 2019-01-22 VITALS
HEART RATE: 104 BPM | SYSTOLIC BLOOD PRESSURE: 168 MMHG | OXYGEN SATURATION: 97 % | DIASTOLIC BLOOD PRESSURE: 99 MMHG | TEMPERATURE: 100 F | RESPIRATION RATE: 18 BRPM

## 2019-01-22 LAB
ALBUMIN SERPL ELPH-MCNC: 3.7 G/DL — SIGNIFICANT CHANGE UP (ref 3.3–5)
ALP SERPL-CCNC: 38 U/L — LOW (ref 40–120)
ALT FLD-CCNC: 27 U/L — SIGNIFICANT CHANGE UP (ref 4–33)
ANION GAP SERPL CALC-SCNC: 13 MMO/L — SIGNIFICANT CHANGE UP (ref 7–14)
AST SERPL-CCNC: 44 U/L — HIGH (ref 4–32)
BASE EXCESS BLDV CALC-SCNC: -1.3 MMOL/L — SIGNIFICANT CHANGE UP
BASOPHILS # BLD AUTO: 0.05 K/UL — SIGNIFICANT CHANGE UP (ref 0–0.2)
BASOPHILS NFR BLD AUTO: 0.5 % — SIGNIFICANT CHANGE UP (ref 0–2)
BILIRUB SERPL-MCNC: 0.3 MG/DL — SIGNIFICANT CHANGE UP (ref 0.2–1.2)
BUN SERPL-MCNC: 11 MG/DL — SIGNIFICANT CHANGE UP (ref 7–23)
CALCIUM SERPL-MCNC: 8.7 MG/DL — SIGNIFICANT CHANGE UP (ref 8.4–10.5)
CHLORIDE SERPL-SCNC: 102 MMOL/L — SIGNIFICANT CHANGE UP (ref 98–107)
CO2 SERPL-SCNC: 20 MMOL/L — LOW (ref 22–31)
CREAT SERPL-MCNC: 0.71 MG/DL — SIGNIFICANT CHANGE UP (ref 0.5–1.3)
EOSINOPHIL # BLD AUTO: 0.06 K/UL — SIGNIFICANT CHANGE UP (ref 0–0.5)
EOSINOPHIL NFR BLD AUTO: 0.6 % — SIGNIFICANT CHANGE UP (ref 0–6)
GAS PNL BLDV: 133 MMOL/L — LOW (ref 136–146)
GLUCOSE BLDV-MCNC: 124 — HIGH (ref 70–99)
GLUCOSE SERPL-MCNC: 121 MG/DL — HIGH (ref 70–99)
HCO3 BLDV-SCNC: 24 MMOL/L — SIGNIFICANT CHANGE UP (ref 20–27)
HCT VFR BLD CALC: 35.8 % — SIGNIFICANT CHANGE UP (ref 34.5–45)
HCT VFR BLDV CALC: 36.1 % — SIGNIFICANT CHANGE UP (ref 34.5–45)
HGB BLD-MCNC: 11.6 G/DL — SIGNIFICANT CHANGE UP (ref 11.5–15.5)
HGB BLDV-MCNC: 11.7 G/DL — SIGNIFICANT CHANGE UP (ref 11.5–15.5)
IMM GRANULOCYTES NFR BLD AUTO: 0.4 % — SIGNIFICANT CHANGE UP (ref 0–1.5)
LYMPHOCYTES # BLD AUTO: 0.35 K/UL — LOW (ref 1–3.3)
LYMPHOCYTES # BLD AUTO: 3.4 % — LOW (ref 13–44)
MCHC RBC-ENTMCNC: 31.8 PG — SIGNIFICANT CHANGE UP (ref 27–34)
MCHC RBC-ENTMCNC: 32.4 % — SIGNIFICANT CHANGE UP (ref 32–36)
MCV RBC AUTO: 98.1 FL — SIGNIFICANT CHANGE UP (ref 80–100)
MONOCYTES # BLD AUTO: 0.51 K/UL — SIGNIFICANT CHANGE UP (ref 0–0.9)
MONOCYTES NFR BLD AUTO: 5 % — SIGNIFICANT CHANGE UP (ref 2–14)
NEUTROPHILS # BLD AUTO: 9.29 K/UL — HIGH (ref 1.8–7.4)
NEUTROPHILS NFR BLD AUTO: 90.1 % — HIGH (ref 43–77)
NRBC # FLD: 0 K/UL — LOW (ref 25–125)
PCO2 BLDV: 35 MMHG — LOW (ref 41–51)
PH BLDV: 7.43 PH — SIGNIFICANT CHANGE UP (ref 7.32–7.43)
PLATELET # BLD AUTO: 202 K/UL — SIGNIFICANT CHANGE UP (ref 150–400)
PMV BLD: 10.9 FL — SIGNIFICANT CHANGE UP (ref 7–13)
PO2 BLDV: < 26 MMHG — LOW (ref 35–40)
POTASSIUM BLDV-SCNC: 4 MMOL/L — SIGNIFICANT CHANGE UP (ref 3.4–4.5)
POTASSIUM SERPL-MCNC: 4.9 MMOL/L — SIGNIFICANT CHANGE UP (ref 3.5–5.3)
POTASSIUM SERPL-SCNC: 4.9 MMOL/L — SIGNIFICANT CHANGE UP (ref 3.5–5.3)
PROT SERPL-MCNC: 6.9 G/DL — SIGNIFICANT CHANGE UP (ref 6–8.3)
RBC # BLD: 3.65 M/UL — LOW (ref 3.8–5.2)
RBC # FLD: 12.6 % — SIGNIFICANT CHANGE UP (ref 10.3–14.5)
SAO2 % BLDV: 96.9 % — HIGH (ref 60–85)
SODIUM SERPL-SCNC: 135 MMOL/L — SIGNIFICANT CHANGE UP (ref 135–145)
WBC # BLD: 10.3 K/UL — SIGNIFICANT CHANGE UP (ref 3.8–10.5)
WBC # FLD AUTO: 10.3 K/UL — SIGNIFICANT CHANGE UP (ref 3.8–10.5)

## 2019-01-22 PROCEDURE — 99283 EMERGENCY DEPT VISIT LOW MDM: CPT

## 2019-01-22 PROCEDURE — 71046 X-RAY EXAM CHEST 2 VIEWS: CPT | Mod: 26

## 2019-01-22 RX ORDER — SODIUM CHLORIDE 9 MG/ML
1000 INJECTION INTRAMUSCULAR; INTRAVENOUS; SUBCUTANEOUS ONCE
Qty: 0 | Refills: 0 | Status: COMPLETED | OUTPATIENT
Start: 2019-01-22 | End: 2019-01-22

## 2019-01-22 RX ORDER — ACETAMINOPHEN 500 MG
650 TABLET ORAL ONCE
Qty: 0 | Refills: 0 | Status: COMPLETED | OUTPATIENT
Start: 2019-01-22 | End: 2019-01-22

## 2019-01-22 RX ORDER — AZITHROMYCIN 500 MG/1
1 TABLET, FILM COATED ORAL
Qty: 5 | Refills: 0
Start: 2019-01-22 | End: 2019-01-26

## 2019-01-22 RX ADMIN — SODIUM CHLORIDE 1000 MILLILITER(S): 9 INJECTION INTRAMUSCULAR; INTRAVENOUS; SUBCUTANEOUS at 10:54

## 2019-01-22 RX ADMIN — Medication 650 MILLIGRAM(S): at 10:54

## 2019-01-22 NOTE — ED PROVIDER NOTE - MEDICAL DECISION MAKING DETAILS
-symptoms c/w viral illness; normal vitals  -blood tinged sputum likely 2/2 bronchitis  -will d/c with supportive care to follow with pmd -symptoms c/w viral illness; normal vitals, will order labs, chest xray   -blood tinged sputum likely 2/2 bronchitis  -will d/c with supportive care to follow with pmd

## 2019-01-22 NOTE — ED ADULT NURSE NOTE - OBJECTIVE STATEMENT
pt received to intake 9, aox3.  pt c/o cough with blood tinged sputum and chest pain x a few days.  SL placed, labs sent, v/s as noted.  NS infusing well.  awaiting further orders, will monitor

## 2019-01-22 NOTE — ED ADULT NURSE NOTE - NSIMPLEMENTINTERV_GEN_ALL_ED
Implemented All Universal Safety Interventions:  [place] to call system. Call bell, personal items and telephone within reach. Instruct patient to call for assistance. Room bathroom lighting operational. Non-slip footwear when patient is off stretcher. Physically safe environment: no spills, clutter or unnecessary equipment. Stretcher in lowest position, wheels locked, appropriate side rails in place.

## 2019-01-22 NOTE — ED PROVIDER NOTE - OBJECTIVE STATEMENT
47 yo F with no PMHx here with dry cough for the past 2-3 months, now over the past few days states the cough is productive and she's coughing up tinges of blood. Pt also reports fevers/chills/bodyaches. Denies cp, sob, n/v, abdominal pain, dysuria, weakness. No sick contacts. No recent travel.

## 2019-01-22 NOTE — ED PROVIDER NOTE - ENMT, MLM
Airway patent, Nasal mucosa clear. Mouth with normal mucosa. Throat has no vesicles, no oropharyngeal exudates and uvula is midline. +nasal congestion

## 2019-01-22 NOTE — ED ADULT TRIAGE NOTE - CHIEF COMPLAINT QUOTE
Pt c/o dry cough for the past 2-3 months, now over the past few days states the cough is productive and she's coughing up blood. Pt also reports fevers/chills/bodyaches

## 2019-02-06 ENCOUNTER — APPOINTMENT (OUTPATIENT)
Dept: INTERNAL MEDICINE | Facility: CLINIC | Age: 49
End: 2019-02-06
Payer: COMMERCIAL

## 2019-02-06 VITALS
SYSTOLIC BLOOD PRESSURE: 144 MMHG | OXYGEN SATURATION: 98 % | DIASTOLIC BLOOD PRESSURE: 90 MMHG | HEART RATE: 80 BPM | HEIGHT: 60.75 IN

## 2019-02-06 VITALS — SYSTOLIC BLOOD PRESSURE: 122 MMHG | DIASTOLIC BLOOD PRESSURE: 78 MMHG

## 2019-02-06 DIAGNOSIS — R03.0 ELEVATED BLOOD-PRESSURE READING, W/OUT DIAGNOSIS OF HYPERTENSION: ICD-10-CM

## 2019-02-06 DIAGNOSIS — Z00.00 ENCOUNTER FOR GENERAL ADULT MEDICAL EXAMINATION W/OUT ABNORMAL FINDINGS: ICD-10-CM

## 2019-02-06 PROCEDURE — 99396 PREV VISIT EST AGE 40-64: CPT

## 2019-02-06 PROCEDURE — G0442 ANNUAL ALCOHOL SCREEN 15 MIN: CPT

## 2019-02-06 NOTE — COUNSELING
[Healthy eating counseling provided] : healthy eating [Activity counseling provided] : activity [None] : None [Good understanding] : Patient has a good understanding of lifestyle changes and the steps needed to achieve self management goals [ - Annual Alcohol Misuse Screening] : Annual Alcohol Misuse Screening [de-identified] : pt counseled on eating a healthy diet, exercise, avoidance of tobacco and alcohol, avoidance of UV light/sunscreen use, safe sex habits/STI prevention, pt agreeable to HIV/STI screening which was offered today, pt counseled to alert us or GYN immediately if she ever has abnormal vaginal bleeding\par \par HCM - preventive topics d/w pt. pt agreeable to HIV screening which was offered today. \par \par Pt advised to call us/come here for a sick visit/or go to urgent care if she is ill. pt advised to go to ED if emergent issues. pt is to call us to discuss results of all lab testing and to discuss any appropriate followup. Pt advised to alert us if she does not receive a phone call or letter with lab test results within 10 days  of today's visit.\par

## 2019-02-06 NOTE — ASSESSMENT
[FreeTextEntry1] : HCM - preventive topics d/w pt\par -check screening labs\par -referred pt to Derm for a skin check\par -she will f/u with GYN\par -pt will f/u with GI for her liver hemangioma this summer as planned\par \par Advised the patient today to return to the office within 12 months or sooner as needed for the next visit and that the patient should see any doctor here at this office for ongoing care\par

## 2019-02-06 NOTE — HISTORY OF PRESENT ILLNESS
[FreeTextEntry1] : CPE [de-identified] : \par Pt reports no symptoms and feels well. She had the "flu" recently and it resolved for the most part aside from a lingering discomfort in the right ear. Overall symptoms have improved.

## 2019-02-06 NOTE — HEALTH RISK ASSESSMENT
[No falls in past year] : Patient reported no falls in the past year [Patient reported mammogram was normal] : Patient reported mammogram was normal [Patient reported colonoscopy was abnormal] : Patient reported colonoscopy was abnormal [HIV Test offered] : HIV Test offered [] :  [Sexually Active] : sexually active [Feels Safe at Home] : Feels safe at home [Fully functional (bathing, dressing, toileting, transferring, walking, feeding)] : Fully functional (bathing, dressing, toileting, transferring, walking, feeding) [Fully functional (using the telephone, shopping, preparing meals, housekeeping, doing laundry, using] : Fully functional and needs no help or supervision to perform IADLs (using the telephone, shopping, preparing meals, housekeeping, doing laundry, using transportation, managing medications and managing finances) [Smoke Detector] : smoke detector [Carbon Monoxide Detector] : carbon monoxide detector [Seat Belt] :  uses seat belt [Sunscreen] : uses sunscreen [] : No [de-identified] : social [UnableToScreenReason] : patient is being managed by Psychiatry [Reports changes in hearing] : Reports no changes in hearing [Reports changes in vision] : Reports no changes in vision [Reports changes in dental health] : Reports no changes in dental health [MammogramDate] : 05/18 [MammogramComments] : pt will have them fax the results to us [ColonoscopyDate] : 05/18 [ColonoscopyComments] : pt is aware repeat is due in 2023 [de-identified] : monogamous with spouse [de-identified] : advised routine dental followup

## 2019-02-06 NOTE — REVIEW OF SYSTEMS
[Depression] : depression [Fever] : no fever [Chills] : no chills [Pain] : no pain [Redness] : no redness [Sore Throat] : no sore throat [Chest Pain] : no chest pain [Shortness Of Breath] : no shortness of breath [Abdominal Pain] : no abdominal pain [Melena] : no melena [Dysuria] : no dysuria [Hematuria] : no hematuria [Joint Swelling] : no joint swelling [Skin Rash] : no skin rash [Dizziness] : no dizziness [Fainting] : no fainting [Easy Bleeding] : no easy bleeding [Easy Bruising] : no easy bruising [FreeTextEntry8] : no abnormal vaginal bleeding [de-identified] : Patient denied any pain/lump/bump/nipple discharge in either breast [de-identified] : pt is being managed by Psychiatry

## 2019-02-07 LAB
ALBUMIN SERPL ELPH-MCNC: 4 G/DL
ALP BLD-CCNC: 43 U/L
ALT SERPL-CCNC: 22 U/L
ANION GAP SERPL CALC-SCNC: 12 MMOL/L
APPEARANCE: CLEAR
AST SERPL-CCNC: 23 U/L
BASOPHILS # BLD AUTO: 0.04 K/UL
BASOPHILS NFR BLD AUTO: 0.4 %
BILIRUB SERPL-MCNC: 0.5 MG/DL
BILIRUBIN URINE: NEGATIVE
BLOOD URINE: NEGATIVE
BUN SERPL-MCNC: 18 MG/DL
CALCIUM SERPL-MCNC: 9.4 MG/DL
CHLORIDE SERPL-SCNC: 105 MMOL/L
CHOLEST SERPL-MCNC: 193 MG/DL
CHOLEST/HDLC SERPL: 3.9 RATIO
CO2 SERPL-SCNC: 23 MMOL/L
COLOR: YELLOW
CREAT SERPL-MCNC: 0.81 MG/DL
EOSINOPHIL # BLD AUTO: 0.19 K/UL
EOSINOPHIL NFR BLD AUTO: 1.9 %
GLUCOSE QUALITATIVE U: NEGATIVE MG/DL
GLUCOSE SERPL-MCNC: 71 MG/DL
HBA1C MFR BLD HPLC: 5.2 %
HCT VFR BLD CALC: 38.5 %
HDLC SERPL-MCNC: 50 MG/DL
HGB BLD-MCNC: 11.6 G/DL
HIV1+2 AB SPEC QL IA.RAPID: NONREACTIVE
IMM GRANULOCYTES NFR BLD AUTO: 0.3 %
KETONES URINE: NEGATIVE
LDLC SERPL CALC-MCNC: 112 MG/DL
LEUKOCYTE ESTERASE URINE: NEGATIVE
LYMPHOCYTES # BLD AUTO: 1.73 K/UL
LYMPHOCYTES NFR BLD AUTO: 17.1 %
MAN DIFF?: NORMAL
MCHC RBC-ENTMCNC: 29.9 PG
MCHC RBC-ENTMCNC: 30.1 GM/DL
MCV RBC AUTO: 99.2 FL
MONOCYTES # BLD AUTO: 0.57 K/UL
MONOCYTES NFR BLD AUTO: 5.6 %
NEUTROPHILS # BLD AUTO: 7.54 K/UL
NEUTROPHILS NFR BLD AUTO: 74.7 %
NITRITE URINE: NEGATIVE
PH URINE: 5.5
PLATELET # BLD AUTO: 363 K/UL
POTASSIUM SERPL-SCNC: 4.1 MMOL/L
PROT SERPL-MCNC: 6.8 G/DL
PROTEIN URINE: NEGATIVE MG/DL
RBC # BLD: 3.88 M/UL
RBC # FLD: 13.2 %
SODIUM SERPL-SCNC: 140 MMOL/L
SPECIFIC GRAVITY URINE: 1.03
T PALLIDUM AB SER QL IA: NEGATIVE
TRIGL SERPL-MCNC: 157 MG/DL
TSH SERPL-ACNC: 2.17 UIU/ML
UROBILINOGEN URINE: NEGATIVE MG/DL
WBC # FLD AUTO: 10.1 K/UL

## 2019-02-20 ENCOUNTER — APPOINTMENT (OUTPATIENT)
Dept: DERMATOLOGY | Facility: CLINIC | Age: 49
End: 2019-02-20
Payer: COMMERCIAL

## 2019-02-20 VITALS — WEIGHT: 125 LBS | DIASTOLIC BLOOD PRESSURE: 82 MMHG | SYSTOLIC BLOOD PRESSURE: 120 MMHG | BODY MASS INDEX: 23.82 KG/M2

## 2019-02-20 DIAGNOSIS — Z87.19 PERSONAL HISTORY OF OTHER DISEASES OF THE DIGESTIVE SYSTEM: ICD-10-CM

## 2019-02-20 DIAGNOSIS — D18.01 HEMANGIOMA OF SKIN AND SUBCUTANEOUS TISSUE: ICD-10-CM

## 2019-02-20 DIAGNOSIS — L71.9 ROSACEA, UNSPECIFIED: ICD-10-CM

## 2019-02-20 DIAGNOSIS — Z12.83 ENCOUNTER FOR SCREENING FOR MALIGNANT NEOPLASM OF SKIN: ICD-10-CM

## 2019-02-20 DIAGNOSIS — L81.2 FRECKLES: ICD-10-CM

## 2019-02-20 PROCEDURE — 99213 OFFICE O/P EST LOW 20 MIN: CPT

## 2019-03-20 ENCOUNTER — RX RENEWAL (OUTPATIENT)
Age: 49
End: 2019-03-20

## 2019-05-14 ENCOUNTER — APPOINTMENT (OUTPATIENT)
Dept: COLORECTAL SURGERY | Facility: CLINIC | Age: 49
End: 2019-05-14
Payer: COMMERCIAL

## 2019-05-14 PROCEDURE — 46221 LIGATION OF HEMORRHOID(S): CPT

## 2019-05-14 PROCEDURE — 99213 OFFICE O/P EST LOW 20 MIN: CPT | Mod: 25

## 2019-05-14 NOTE — HISTORY OF PRESENT ILLNESS
[FreeTextEntry1] : 48-year-old female with history of rubber band ligation for bleeding internal hemorrhoids. Last banding a little over one year ago. Currently denies pain but reports bleeding with every bowel movement. Bowel movements almost daily. Patient takes Senokot intermittently. Has had little improvement with fiber supplementation

## 2019-05-14 NOTE — ASSESSMENT
[FreeTextEntry1] : Bleeding internal hemorrhoids\par -Rubber band ligation was performed on the left lateral internal hemorrhoid without complication\par -I. once again recommended patient attempt fiber supplementation\par -Follow up in 2 weeks for next rubber band ligation

## 2019-05-15 ENCOUNTER — APPOINTMENT (OUTPATIENT)
Dept: OBGYN | Facility: CLINIC | Age: 49
End: 2019-05-15

## 2019-05-22 ENCOUNTER — APPOINTMENT (OUTPATIENT)
Dept: OBGYN | Facility: CLINIC | Age: 49
End: 2019-05-22
Payer: COMMERCIAL

## 2019-05-22 VITALS — HEIGHT: 60.75 IN | BODY MASS INDEX: 24.29 KG/M2 | WEIGHT: 127 LBS

## 2019-05-22 DIAGNOSIS — Z01.419 ENCOUNTER FOR GYNECOLOGICAL EXAMINATION (GENERAL) (ROUTINE) W/OUT ABNORMAL FINDINGS: ICD-10-CM

## 2019-05-22 PROCEDURE — 99213 OFFICE O/P EST LOW 20 MIN: CPT

## 2019-05-22 RX ORDER — NALTREXONE HYDROCHLORIDE 50 MG/1
TABLET, FILM COATED ORAL
Refills: 0 | Status: COMPLETED | COMMUNITY
End: 2019-05-22

## 2019-05-22 RX ORDER — QUETIAPINE FUMARATE 25 MG/1
25 TABLET ORAL
Qty: 90 | Refills: 0 | Status: COMPLETED | COMMUNITY
Start: 2018-10-08 | End: 2019-05-22

## 2019-05-22 RX ORDER — MELOXICAM 15 MG/1
15 TABLET ORAL DAILY
Qty: 30 | Refills: 4 | Status: COMPLETED | COMMUNITY
Start: 2018-10-23 | End: 2019-05-22

## 2019-05-22 RX ORDER — METRONIDAZOLE 7.5 MG/G
0.75 GEL VAGINAL
Qty: 70 | Refills: 0 | Status: COMPLETED | COMMUNITY
Start: 2018-10-10 | End: 2019-05-22

## 2019-05-22 RX ORDER — FLUCONAZOLE 150 MG/1
150 TABLET ORAL
Qty: 4 | Refills: 0 | Status: COMPLETED | COMMUNITY
Start: 2018-10-10 | End: 2019-05-22

## 2019-05-22 RX ORDER — ALPRAZOLAM 0.5 MG/1
0.5 TABLET, EXTENDED RELEASE ORAL
Qty: 60 | Refills: 0 | Status: COMPLETED | COMMUNITY
Start: 2017-10-02 | End: 2019-05-22

## 2019-05-22 RX ORDER — ALPRAZOLAM 1 MG/1
1 TABLET, EXTENDED RELEASE ORAL
Qty: 60 | Refills: 0 | Status: COMPLETED | COMMUNITY
Start: 2017-10-02 | End: 2019-05-22

## 2019-05-22 RX ORDER — VALACYCLOVIR 500 MG/1
500 TABLET, FILM COATED ORAL
Qty: 60 | Refills: 0 | Status: COMPLETED | COMMUNITY
Start: 2018-04-05 | End: 2019-05-22

## 2019-05-22 RX ORDER — METRONIDAZOLE 7.5 MG/G
0.75 GEL TOPICAL TWICE DAILY
Qty: 1 | Refills: 4 | Status: COMPLETED | COMMUNITY
Start: 2018-06-05 | End: 2019-05-22

## 2019-05-22 RX ORDER — ALBUTEROL SULFATE 108 UG/1
108 (90 BASE) AEROSOL, METERED RESPIRATORY (INHALATION)
Qty: 1 | Refills: 1 | Status: COMPLETED | COMMUNITY
Start: 2017-06-21 | End: 2019-05-22

## 2019-05-28 ENCOUNTER — APPOINTMENT (OUTPATIENT)
Dept: COLORECTAL SURGERY | Facility: CLINIC | Age: 49
End: 2019-05-28
Payer: COMMERCIAL

## 2019-05-28 PROCEDURE — 99213 OFFICE O/P EST LOW 20 MIN: CPT | Mod: 25

## 2019-05-28 PROCEDURE — 46221 LIGATION OF HEMORRHOID(S): CPT

## 2019-05-28 NOTE — PHYSICAL EXAM
[Awake] : awake [Acute Distress] : no acute distress [Thyroid Nodule] : no thyroid nodule [Alert] : alert [Goiter] : no goiter [Mass] : no breast mass [Axillary LAD] : no axillary lymphadenopathy [Nipple Discharge] : no nipple discharge [Tender] : non tender [Soft] : soft [Oriented x3] : oriented to person, place, and time [Normal] : uterus [No Bleeding] : there was no active vaginal bleeding [Uterine Adnexae] : were not tender and not enlarged

## 2019-05-28 NOTE — COUNSELING
[Nutrition] : nutrition [Breast Self Exam] : breast self exam [Vitamins/Supplements] : vitamins/supplements [Exercise] : exercise [STD (testing, results, tx)] : STD (testing, results, tx) [Contraception] : contraception

## 2019-05-28 NOTE — PROCEDURE
[Liquid Base] : liquid base [Cervical Pap Smear] : cervical Pap smear [No Complications] : there were no complications [Tolerated Well] : the patient tolerated the procedure well

## 2019-05-28 NOTE — HISTORY OF PRESENT ILLNESS
[FreeTextEntry1] : 48-year-old female status post rubber band ligation x1. She reports almost complete cessation of bleeding. Denies pain. Patient has yet to start fiber supplementation due to the Taste

## 2019-05-28 NOTE — ASSESSMENT
[FreeTextEntry1] : Bleeding internal hemorrhoids\par -Patient with initial positive results after first rubber band ligation\par -Rubber band ligation was performed on the right posterior internal hemorrhoid without complication\par -Patient to followup in 2 weeks for final banding\par -Patient is still considering whether to initiate fiber supplementation

## 2019-05-28 NOTE — HISTORY OF PRESENT ILLNESS
[1 Year Ago] : 1 year ago [Good] : being in good health [Healthy Diet] : a healthy diet [Weight Concerns] : no concerns with her weight [Regular Exercise] : regular exercise [Perimenopausal] : is perimenopausal [Menstrual Problems] : reports normal menses [Regular Cycle Intervals] : periods have been regular [Up to Date] : up to date with ~his/her~ STD screening [Sexually Active] : is sexually active [Monogamous] : is monogamous

## 2019-05-31 ENCOUNTER — NON-APPOINTMENT (OUTPATIENT)
Age: 49
End: 2019-05-31

## 2019-05-31 ENCOUNTER — APPOINTMENT (OUTPATIENT)
Dept: CARDIOLOGY | Facility: CLINIC | Age: 49
End: 2019-05-31
Payer: COMMERCIAL

## 2019-05-31 VITALS
BODY MASS INDEX: 24.29 KG/M2 | SYSTOLIC BLOOD PRESSURE: 120 MMHG | WEIGHT: 127 LBS | DIASTOLIC BLOOD PRESSURE: 84 MMHG | HEIGHT: 60.75 IN

## 2019-05-31 PROCEDURE — 93000 ELECTROCARDIOGRAM COMPLETE: CPT

## 2019-05-31 PROCEDURE — 99205 OFFICE O/P NEW HI 60 MIN: CPT

## 2019-05-31 RX ORDER — ONDANSETRON 4 MG/1
4 TABLET ORAL
Qty: 18 | Refills: 0 | Status: DISCONTINUED | COMMUNITY
Start: 2019-02-11 | End: 2019-05-31

## 2019-05-31 RX ORDER — LEVONORGESTREL AND ETHINYL ESTRADIOL 100-20(84)
0.1-0.02 & 0.01 KIT ORAL
Qty: 91 | Refills: 0 | Status: ACTIVE | COMMUNITY
Start: 2019-05-30

## 2019-05-31 RX ORDER — METHYLPREDNISOLONE 4 MG/1
4 TABLET ORAL
Qty: 1 | Refills: 0 | Status: DISCONTINUED | COMMUNITY
Start: 2018-11-28 | End: 2019-05-31

## 2019-05-31 RX ORDER — QUETIAPINE FUMARATE 300 MG/1
300 TABLET ORAL
Qty: 30 | Refills: 0 | Status: DISCONTINUED | COMMUNITY
Start: 2017-09-07 | End: 2019-05-31

## 2019-05-31 NOTE — PHYSICAL EXAM
[5th Left ICS - MCL] : palpated at the 5th LICS in the midclavicular line [Normal] : normal [Normal Rate] : normal [Rhythm Regular] : regular [Normal S1] : normal S1 [Normal S2] : normal S2 [No Gallop] : no gallop heard [Click] : a ~M click was heard [II] : a grade 2 [Medium] : medium pitched [Mid] : mid [General Appearance - Well Developed] : well developed [Normal Appearance] : normal appearance [Well Groomed] : well groomed [General Appearance - Well Nourished] : well nourished [No Deformities] : no deformities [General Appearance - In No Acute Distress] : no acute distress [Normal Conjunctiva] : the conjunctiva exhibited no abnormalities [Eyelids - No Xanthelasma] : the eyelids demonstrated no xanthelasmas [Normal Oral Mucosa] : normal oral mucosa [No Oral Pallor] : no oral pallor [No Oral Cyanosis] : no oral cyanosis [Normal Jugular Venous A Waves Present] : normal jugular venous A waves present [Normal Jugular Venous V Waves Present] : normal jugular venous V waves present [No Jugular Venous Dc A Waves] : no jugular venous dc A waves [2+] : left 2+ [No Abnormalities] : the abdominal aorta was not enlarged and no bruit was heard [No Pitting Edema] : no pitting edema present [Respiration, Rhythm And Depth] : normal respiratory rhythm and effort [Exaggerated Use Of Accessory Muscles For Inspiration] : no accessory muscle use [Auscultation Breath Sounds / Voice Sounds] : lungs were clear to auscultation bilaterally [Abdomen Soft] : soft [Abdomen Tenderness] : non-tender [Abdomen Mass (___ Cm)] : no abdominal mass palpated [Abnormal Walk] : normal gait [Gait - Sufficient For Exercise Testing] : the gait was sufficient for exercise testing [Nail Clubbing] : no clubbing of the fingernails [Cyanosis, Localized] : no localized cyanosis [Petechial Hemorrhages (___cm)] : no petechial hemorrhages [Skin Color & Pigmentation] : normal skin color and pigmentation [] : no rash [No Venous Stasis] : no venous stasis [Skin Lesions] : no skin lesions [No Skin Ulcers] : no skin ulcer [No Xanthoma] : no  xanthoma was observed [Oriented To Time, Place, And Person] : oriented to person, place, and time [Affect] : the affect was normal [Mood] : the mood was normal [No Anxiety] : not feeling anxious [Right Carotid Bruit] : no bruit heard over the right carotid [Left Carotid Bruit] : no bruit heard over the left carotid

## 2019-05-31 NOTE — DISCUSSION/SUMMARY
[Essential Hypertension] : essential hypertension [Responding to Treatment] : responding to treatment [None] : none [Mild Mitral Regurgitation] : mild mitral regurgitation [Patient] : the patient [de-identified] : continue amlodipine 10 mg daily

## 2019-05-31 NOTE — HISTORY OF PRESENT ILLNESS
[FreeTextEntry1] : Mrs. Melody Peña is a 48 year old woman in generally good health first observed to have hypertension about 6 months ago. Recently started medication. There is a family history of hypertension and mother had cerebral aneurysm, father unexplained sudden death. She is active, participates regularly in [employer] and keeps up with everybody.

## 2019-06-25 ENCOUNTER — APPOINTMENT (OUTPATIENT)
Dept: COLORECTAL SURGERY | Facility: CLINIC | Age: 49
End: 2019-06-25
Payer: COMMERCIAL

## 2019-06-25 DIAGNOSIS — K62.5 HEMORRHAGE OF ANUS AND RECTUM: ICD-10-CM

## 2019-06-25 PROCEDURE — 46221 LIGATION OF HEMORRHOID(S): CPT

## 2019-06-25 PROCEDURE — 99213 OFFICE O/P EST LOW 20 MIN: CPT | Mod: 25

## 2019-06-25 NOTE — HISTORY OF PRESENT ILLNESS
[FreeTextEntry1] : Status post rubber band ligation x2.Patient reports significant decrease in bleeding. However small blood on toilet paper noted with each bowel movement. No fevers or chills. No pain patient attempting fiber supplementation with minimal improvement.

## 2019-06-25 NOTE — ASSESSMENT
[FreeTextEntry1] : Bleeding internal hemorrhoids\par -Rubber band ligation was performed on the right anterior internal hemorrhoid without complication\par -Patient to monitor results. If persistent bleeding will followup for additional banding.\par -Patient to continue fiber supplement as tolerated. We once again discussed different laxatives and benefits and risks of each\par -Patient follow up as needed

## 2019-07-03 ENCOUNTER — TRANSCRIPTION ENCOUNTER (OUTPATIENT)
Age: 49
End: 2019-07-03

## 2019-07-03 ENCOUNTER — OTHER (OUTPATIENT)
Age: 49
End: 2019-07-03

## 2019-07-03 DIAGNOSIS — D18.03 HEMANGIOMA OF INTRA-ABDOMINAL STRUCTURES: ICD-10-CM

## 2019-07-26 ENCOUNTER — OUTPATIENT (OUTPATIENT)
Dept: OUTPATIENT SERVICES | Facility: HOSPITAL | Age: 49
LOS: 1 days | End: 2019-07-26
Payer: COMMERCIAL

## 2019-07-26 ENCOUNTER — APPOINTMENT (OUTPATIENT)
Dept: MRI IMAGING | Facility: CLINIC | Age: 49
End: 2019-07-26
Payer: COMMERCIAL

## 2019-07-26 DIAGNOSIS — D18.03 HEMANGIOMA OF INTRA-ABDOMINAL STRUCTURES: ICD-10-CM

## 2019-07-26 DIAGNOSIS — K76.89 OTHER SPECIFIED DISEASES OF LIVER: ICD-10-CM

## 2019-07-26 PROCEDURE — 74183 MRI ABD W/O CNTR FLWD CNTR: CPT

## 2019-07-26 PROCEDURE — A9585: CPT

## 2019-07-26 PROCEDURE — 74183 MRI ABD W/O CNTR FLWD CNTR: CPT | Mod: 26

## 2019-08-02 ENCOUNTER — OTHER (OUTPATIENT)
Age: 49
End: 2019-08-02

## 2019-08-07 ENCOUNTER — OTHER (OUTPATIENT)
Age: 49
End: 2019-08-07

## 2019-08-08 ENCOUNTER — OTHER (OUTPATIENT)
Age: 49
End: 2019-08-08

## 2019-08-12 ENCOUNTER — OTHER (OUTPATIENT)
Age: 49
End: 2019-08-12

## 2019-09-03 ENCOUNTER — APPOINTMENT (OUTPATIENT)
Dept: RADIOLOGY | Facility: CLINIC | Age: 49
End: 2019-09-03
Payer: COMMERCIAL

## 2019-09-03 ENCOUNTER — APPOINTMENT (OUTPATIENT)
Dept: RHEUMATOLOGY | Facility: CLINIC | Age: 49
End: 2019-09-03
Payer: COMMERCIAL

## 2019-09-03 ENCOUNTER — OUTPATIENT (OUTPATIENT)
Dept: OUTPATIENT SERVICES | Facility: HOSPITAL | Age: 49
LOS: 1 days | End: 2019-09-03
Payer: COMMERCIAL

## 2019-09-03 VITALS
DIASTOLIC BLOOD PRESSURE: 82 MMHG | TEMPERATURE: 98.4 F | OXYGEN SATURATION: 96 % | WEIGHT: 125 LBS | HEIGHT: 60.75 IN | HEART RATE: 80 BPM | SYSTOLIC BLOOD PRESSURE: 132 MMHG | BODY MASS INDEX: 23.91 KG/M2

## 2019-09-03 DIAGNOSIS — M54.5 LOW BACK PAIN: ICD-10-CM

## 2019-09-03 PROCEDURE — 73522 X-RAY EXAM HIPS BI 3-4 VIEWS: CPT | Mod: 26

## 2019-09-03 PROCEDURE — 72110 X-RAY EXAM L-2 SPINE 4/>VWS: CPT | Mod: 26

## 2019-09-03 PROCEDURE — 72110 X-RAY EXAM L-2 SPINE 4/>VWS: CPT

## 2019-09-03 PROCEDURE — 99203 OFFICE O/P NEW LOW 30 MIN: CPT

## 2019-09-03 PROCEDURE — 73522 X-RAY EXAM HIPS BI 3-4 VIEWS: CPT

## 2019-09-10 ENCOUNTER — APPOINTMENT (OUTPATIENT)
Dept: ORTHOPEDIC SURGERY | Facility: CLINIC | Age: 49
End: 2019-09-10
Payer: COMMERCIAL

## 2019-09-10 PROCEDURE — 73130 X-RAY EXAM OF HAND: CPT | Mod: RT

## 2019-09-10 PROCEDURE — 99214 OFFICE O/P EST MOD 30 MIN: CPT

## 2019-09-10 NOTE — CONSULT LETTER
[Dear  ___] : Dear  [unfilled], [Consult Letter:] : I had the pleasure of evaluating your patient, [unfilled]. [Please see my note below.] : Please see my note below. [Consult Closing:] : Thank you very much for allowing me to participate in the care of this patient.  If you have any questions, please do not hesitate to contact me. [Sincerely,] : Sincerely, [FreeTextEntry3] : Dominick Stevens M.D.\par Surgery of the Hand & Upper Extremity\par Orthopaedic Surgery\par Chief, Hand Service, [facility]\par Director, Hand Service, [facility]\par  of Orthopedic Surgery, [facility] [employer] [facility] at [facility] \par [facility]\par \par Email: [email or web address] Office Phone: [phone]\par

## 2019-09-10 NOTE — PHYSICAL EXAM
[de-identified] : - Constitutional: This is a female in no obvious distress.  \par - Psych: Patient is alert and oriented to person, place and time.  Patient has a normal mood and affect.\par - Cardiovascular: Normal pulses throughout the upper extremities.  No significant varicosities are noted in the upper extremities. \par - Neuro: Strength and sensation are intact throughout the upper extremities.  Patient has normal coordination.\par - Respiratory:  Patient exhibits no evidence of shortness of breath or difficulty breathing.\par - Skin: No rashes, lesions, or other abnormalities are noted in the upper extremities.\par \par ---\par \par Examination of both hands demonstrates no obvious thenar atrophy.  She has normal sensation to light touch throughout the radial, ulnar and median nerve distributions bilaterally.  Provocative signs for carpal tunnel syndrome are positive on the right side and negative on the left.  She does have some difficulty with abduction of the right thumb and some weakness of the thenar muscles.  There is no evidence of a trigger finger.  She has full flexion and extension of the digits.  She has complaints of some pain along the dorsal aspect of the right hand.  There is no localized swelling or tenderness.  There is mild tenderness along the CMC joint of the thumb. 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

## 2019-09-10 NOTE — DISCUSSION/SUMMARY
[FreeTextEntry1] : She has findings consistent with right greater than left hand pain numbness and tingling and thumb dysfunction.  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  At this time I have ordered EMGs to better evaluate her complaints and to better evaluate her carpal tunnel syndrome.  I would like to also determine whether or not her weakness in the thumb is secondary to weakness of the thenar muscles.  Her examination is indeterminate in this regard.  In the meantime, she was instructed on the use of the carpal tunnel splint, particular at night.  She will follow-up after the EMGs to review the results and discuss treatment recommendations.\par \par The patient has agreed to this plan of management and has expressed full understanding.  All questions were fully answered to the patient's satisfaction.\par \par Over 50% of the time spent with the patient was on counseling the patient on the above diagnosis, treatment plan and prognosis.

## 2019-09-10 NOTE — HISTORY OF PRESENT ILLNESS
[Right] : right hand dominant [FreeTextEntry1] : She comes in today for evaluation of right right greater than left hand pain as well as numbness and tingling.  She has had symptoms for many months.  She denies an injury.  Her symptoms are worse with activity.  She has not been treated.\par \par -She was referred by Dr. Reynolds, the rheumatologist that she saw 1 week ago.\par \par -She works as a psychotherapist.

## 2019-09-26 ENCOUNTER — TRANSCRIPTION ENCOUNTER (OUTPATIENT)
Age: 49
End: 2019-09-26

## 2019-10-29 ENCOUNTER — NON-APPOINTMENT (OUTPATIENT)
Age: 49
End: 2019-10-29

## 2019-10-29 ENCOUNTER — APPOINTMENT (OUTPATIENT)
Dept: CARDIOLOGY | Facility: CLINIC | Age: 49
End: 2019-10-29
Payer: COMMERCIAL

## 2019-10-29 VITALS
HEART RATE: 67 BPM | DIASTOLIC BLOOD PRESSURE: 97 MMHG | WEIGHT: 125 LBS | SYSTOLIC BLOOD PRESSURE: 163 MMHG | OXYGEN SATURATION: 100 % | BODY MASS INDEX: 23.82 KG/M2

## 2019-10-29 VITALS — HEART RATE: 68 BPM | SYSTOLIC BLOOD PRESSURE: 138 MMHG | DIASTOLIC BLOOD PRESSURE: 70 MMHG

## 2019-10-29 PROCEDURE — 99214 OFFICE O/P EST MOD 30 MIN: CPT

## 2019-10-29 PROCEDURE — 93000 ELECTROCARDIOGRAM COMPLETE: CPT

## 2019-10-29 RX ORDER — AMLODIPINE BESYLATE 10 MG/1
10 TABLET ORAL DAILY
Qty: 90 | Refills: 3 | Status: ACTIVE | COMMUNITY
Start: 2019-06-23

## 2019-10-29 RX ORDER — GLUC/MSM/COLGN2/HYAL/ANTIARTH3 375-375-20
TABLET ORAL
Refills: 0 | Status: DISCONTINUED | COMMUNITY
End: 2019-10-29

## 2019-10-29 NOTE — HISTORY OF PRESENT ILLNESS
[FreeTextEntry1] : Mrs. Melody Peña is a 49 year old woman in generally good health first observed to have hypertension about 6 months ago. Recently started medication. There is a family history of hypertension and mother had cerebral aneurysm, father unexplained sudden death. She is active, participates regularly in [employer] and keeps up with everybody.\par \par Reports that recently feeling unwell checked blood pressure which was about 168/115. Saw an internist who added quinapril to regimen. She continues to feel vaguely unwell, blurry vision in afternoon, but exercises regularly, Cheri and has no problem keeping up.

## 2019-10-29 NOTE — DISCUSSION/SUMMARY
[Essential Hypertension] : essential hypertension [Responding to Treatment] : responding to treatment [None] : none [Patient] : the patient [de-identified] : continue amlodipine 10 mg daily and quinapril [FreeTextEntry2] : and

## 2019-10-29 NOTE — REVIEW OF SYSTEMS
[Headache] : headache [Joint Pain] : joint pain [Knee Pain] : knee pain [Negative] : Heme/Lymph [Palpitations] : palpitations

## 2019-10-29 NOTE — PHYSICAL EXAM
[General Appearance - Well Developed] : well developed [Normal Appearance] : normal appearance [Well Groomed] : well groomed [General Appearance - Well Nourished] : well nourished [No Deformities] : no deformities [General Appearance - In No Acute Distress] : no acute distress [Normal Conjunctiva] : the conjunctiva exhibited no abnormalities [Eyelids - No Xanthelasma] : the eyelids demonstrated no xanthelasmas [Normal Oral Mucosa] : normal oral mucosa [No Oral Pallor] : no oral pallor [No Oral Cyanosis] : no oral cyanosis [Normal Jugular Venous A Waves Present] : normal jugular venous A waves present [Normal Jugular Venous V Waves Present] : normal jugular venous V waves present [No Jugular Venous Dc A Waves] : no jugular venous dc A waves [Respiration, Rhythm And Depth] : normal respiratory rhythm and effort [Exaggerated Use Of Accessory Muscles For Inspiration] : no accessory muscle use [Auscultation Breath Sounds / Voice Sounds] : lungs were clear to auscultation bilaterally [5th Left ICS - MCL] : palpated at the 5th LICS in the midclavicular line [Normal] : normal [Normal Rate] : normal [Rhythm Regular] : regular [Normal S1] : normal S1 [Normal S2] : normal S2 [No Gallop] : no gallop heard [Click] : a ~M click was heard [II] : a grade 2 [Medium] : medium pitched [Mid] : mid [2+] : left 2+ [No Abnormalities] : the abdominal aorta was not enlarged and no bruit was heard [No Pitting Edema] : no pitting edema present [Abdomen Soft] : soft [Abdomen Tenderness] : non-tender [Abdomen Mass (___ Cm)] : no abdominal mass palpated [Abnormal Walk] : normal gait [Gait - Sufficient For Exercise Testing] : the gait was sufficient for exercise testing [Nail Clubbing] : no clubbing of the fingernails [Cyanosis, Localized] : no localized cyanosis [Petechial Hemorrhages (___cm)] : no petechial hemorrhages [Skin Color & Pigmentation] : normal skin color and pigmentation [] : no rash [No Venous Stasis] : no venous stasis [Skin Lesions] : no skin lesions [No Skin Ulcers] : no skin ulcer [No Xanthoma] : no  xanthoma was observed [Oriented To Time, Place, And Person] : oriented to person, place, and time [Affect] : the affect was normal [Mood] : the mood was normal [No Anxiety] : not feeling anxious [Right Carotid Bruit] : no bruit heard over the right carotid [Left Carotid Bruit] : no bruit heard over the left carotid

## 2019-11-01 ENCOUNTER — APPOINTMENT (OUTPATIENT)
Dept: OPHTHALMOLOGY | Facility: CLINIC | Age: 49
End: 2019-11-01
Payer: COMMERCIAL

## 2019-11-01 ENCOUNTER — NON-APPOINTMENT (OUTPATIENT)
Age: 49
End: 2019-11-01

## 2019-11-01 PROCEDURE — 92014 COMPRE OPH EXAM EST PT 1/>: CPT

## 2019-11-01 PROCEDURE — 92015 DETERMINE REFRACTIVE STATE: CPT

## 2019-11-01 PROCEDURE — 92133 CPTRZD OPH DX IMG PST SGM ON: CPT

## 2019-11-19 ENCOUNTER — APPOINTMENT (OUTPATIENT)
Dept: NEUROLOGY | Facility: CLINIC | Age: 49
End: 2019-11-19
Payer: COMMERCIAL

## 2019-11-19 VITALS
TEMPERATURE: 98.1 F | DIASTOLIC BLOOD PRESSURE: 68 MMHG | HEIGHT: 61 IN | SYSTOLIC BLOOD PRESSURE: 120 MMHG | WEIGHT: 119 LBS | RESPIRATION RATE: 16 BRPM | HEART RATE: 68 BPM | BODY MASS INDEX: 22.47 KG/M2 | OXYGEN SATURATION: 98 %

## 2019-11-19 PROCEDURE — 95910 NRV CNDJ TEST 7-8 STUDIES: CPT

## 2019-11-19 PROCEDURE — 95886 MUSC TEST DONE W/N TEST COMP: CPT

## 2019-11-19 RX ORDER — LEVONORGESTREL / ETHINYL ESTRADIOL 0.15-0.03
KIT ORAL
Refills: 0 | Status: DISCONTINUED | COMMUNITY
End: 2019-11-19

## 2019-11-19 RX ORDER — SENNOSIDES 8.6 MG TABLETS 8.6 MG/1
TABLET ORAL
Refills: 0 | Status: DISCONTINUED | COMMUNITY
End: 2019-11-19

## 2019-11-19 NOTE — PROCEDURE
[FreeTextEntry1] : Motor nerve conduction\par Right median nerve compound motor action potential had normal latency, normal amplitude and normal cv. \par Left median nerve compound motor action potential had normal latency, normal amplitude and normal cv.\par Right ulnar nerve compound motor action potential had normal latency, normal amplitude and normal cv. \par F wave latencies of bilateral median and right ulnar nerves were normal. \par Sensory nerve conduction\par Right median sensory nerve action potential had normal latency, normal amplitude and decreased cv. \par Left median sensory nerve action potential had normal latency, normal amplitude, and decreased conduction velocity.\par Bilateral ulnar sensory nerve action potential had normal latencies, normal amplitudes and normal CV.\par Needle EMG was performed using a disposable concentric needle in the listed muscles:\par Right deltoid, biceps, pronator teres, EDC, FDI had no spontaneous activity and normal motor units. 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 If indicated, will recommend repeat study in 5 months. \par Clinical and radiologic correlation is advised.\par \par Thank you for the consult,\par Ollie Martin MD\par

## 2019-12-04 ENCOUNTER — APPOINTMENT (OUTPATIENT)
Dept: ORTHOPEDIC SURGERY | Facility: CLINIC | Age: 49
End: 2019-12-04
Payer: COMMERCIAL

## 2019-12-04 PROCEDURE — 20526 THER INJECTION CARP TUNNEL: CPT | Mod: RT

## 2019-12-04 PROCEDURE — 99214 OFFICE O/P EST MOD 30 MIN: CPT | Mod: 25

## 2019-12-04 NOTE — HISTORY OF PRESENT ILLNESS
[FreeTextEntry1] : Follow-up regarding right right greater than left hand pain as well as numbness and tingling.  I do not have the notes from when she was seen in the office 3 months ago.  I ordered EMGs.  She comes in to review the results and discuss treatment recommendations.\par \par She braces at night, which helps reduce her pain but the pain is persistent.  She is most bothered by the right side.\par \par -She works as a psychotherapist.

## 2019-12-04 NOTE — ADDENDUM
[FreeTextEntry1] : I, Juan M Levine, acted solely as a scribe for Dr. Dominick Stevens on 12/04/2019 . \par \par All medical record entries made by the Scribe were at my, Dr. Dominick Stevens, direction and personally dictated by me on 12/04/2019. I have personally reviewed the chart and agree that the record accurately reflects my personal performance of the history, physical exam, assessment and plan.

## 2019-12-04 NOTE — DISCUSSION/SUMMARY
[FreeTextEntry1] : I reviewed her EMGs.  This demonstrated mild bilateral carpal tunnel syndrome.\par \par I reviewed the results with her.  I had a discussion regarding today's visit, the diagnosis, and my treatment recommendations.  Further treatment recommendations / options were discussed. We discussed potential treatment options. Based on her EMG, I would not recommend surgery.  At this time, as her pain is constant and is quite significant, she has elected to proceed with a cortisone injection. She will follow up in 1 month to assess her response to the injection.. \par \par The patient has agreed to the above plan of management and has expressed full understanding.  All questions were fully answered to the patient's satisfaction.\par \par I spent at least 25 minutes of face-to-face time with the patient.  Over 50% of this time was spent on counseling the patient on the above diagnosis, treatment plan and prognosis.

## 2019-12-04 NOTE — PHYSICAL EXAM
[de-identified] : - Constitutional: This is a female in no obvious distress.  \par - Psych: Patient is alert and oriented to person, place and time.  Patient has a normal mood and affect.\par - Cardiovascular: Normal pulses throughout the upper extremities.  No significant varicosities are noted in the upper extremities. \par - Neuro: Strength and sensation are intact throughout the upper extremities.  Patient has normal coordination.\par - Respiratory:  Patient exhibits no evidence of shortness of breath or difficulty breathing.\par - Skin: No rashes, lesions, or other abnormalities are noted in the upper extremities.\par \par ---\par \par Examination of both hands demonstrates no obvious thenar atrophy.  She has normal sensation to light touch throughout the radial, ulnar and median nerve distributions bilaterally.  Provocative signs for carpal tunnel syndrome are positive on the right side and negative on the left.  She does have some difficulty with abduction of the right thumb and some weakness of the thenar muscles.  There is no evidence of a trigger finger.  She has full flexion and extension of the digits.  She has complaints of some pain along the dorsal aspect of the right hand.  There is no localized swelling or tenderness.  There is mild tenderness along the CMC joint of the thumb. 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

## 2019-12-04 NOTE — PROCEDURE
[FreeTextEntry1] : -  After a discussion of risks and benefits, the patient agreed to proceed with a cortisone injection.  \par -  Side injected: Right carpal tunnel.\par -  Medications injected:1 cc of 1% Lidocaine and 1 cc of Betamethasone, 6mg/cc, using sterile technique.\par -  Patient tolerated the procedure well, without complications.\par -  Instructions: The patient was was instructed on the use of a carpal tunnel splint, especially at night.\par -  Follow-up: Within 4 weeks to assess response to the injection.

## 2020-01-08 ENCOUNTER — APPOINTMENT (OUTPATIENT)
Dept: ORTHOPEDIC SURGERY | Facility: CLINIC | Age: 50
End: 2020-01-08
Payer: COMMERCIAL

## 2020-01-08 PROCEDURE — 20526 THER INJECTION CARP TUNNEL: CPT | Mod: RT

## 2020-01-08 PROCEDURE — 99213 OFFICE O/P EST LOW 20 MIN: CPT | Mod: 25

## 2020-01-08 RX ORDER — BETAMETHA AC,SOD PHOS/WATER/PF 6 MG/ML
6 (3-3) VIAL (ML) INJECTION
Qty: 1 | Refills: 0 | Status: COMPLETED | OUTPATIENT
Start: 2020-01-08

## 2020-01-08 RX ORDER — LIDOCAINE HYDROCHLORIDE 10 MG/ML
1 INJECTION, SOLUTION INFILTRATION; PERINEURAL
Refills: 0 | Status: COMPLETED | OUTPATIENT
Start: 2020-01-08

## 2020-01-08 RX ADMIN — BETAMETHASONE ACETATE AND BETAMETHASONE SODIUM PHOSPHATE 1 MG/ML: 3; 3 INJECTION, SUSPENSION INTRA-ARTICULAR; INTRALESIONAL; INTRAMUSCULAR; SOFT TISSUE at 00:00

## 2020-01-08 RX ADMIN — LIDOCAINE HYDROCHLORIDE 1 %: 10 INJECTION, SOLUTION INFILTRATION; PERINEURAL at 00:00

## 2020-01-08 NOTE — PHYSICAL EXAM
[de-identified] : - Constitutional: This is a female in no obvious distress.  \par - Psych: Patient is alert and oriented to person, place and time.  Patient has a normal mood and affect.\par - Cardiovascular: Normal pulses throughout the upper extremities.  No significant varicosities are noted in the upper extremities. \par - Neuro: Strength and sensation are intact throughout the upper extremities.  Patient has normal coordination.\par - Respiratory:  Patient exhibits no evidence of shortness of breath or difficulty breathing.\par - Skin: No rashes, lesions, or other abnormalities are noted in the upper extremities.\par \par ---\par \par Examination of both hands demonstrates no obvious thenar atrophy.  She has normal sensation to light touch throughout the radial, ulnar and median nerve distributions bilaterally.  Provocative signs for carpal tunnel syndrome are partially positive on the right side and negative on the left.  She does have some difficulty with abduction of the right thumb and some weakness of the thenar muscles.  There is no evidence of a trigger finger.  She has full flexion and extension of the digits.  She has complaints of some pain along the dorsal aspect of the right hand.  There is no localized swelling or tenderness dorsally.  There is also some tenderness along the flexor tendons at the wrist flexion crease. 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

## 2020-01-08 NOTE — ADDENDUM
[FreeTextEntry1] : I, Juan M Levine, acted solely as a scribe for Dr. Dominick Stevens on 01/08/2020 . \par \par All medical record entries made by the Scribe were at my, Dr. Dominick Stevens, direction and personally dictated by me on 01/08/2020. I have personally reviewed the chart and agree that the record accurately reflects my personal performance of the history, physical exam, assessment and plan.

## 2020-01-08 NOTE — HISTORY OF PRESENT ILLNESS
[FreeTextEntry1] : Follow-up regarding bilateral carpal tunnel syndrome.  See note from when she was seen in the office 5 weeks ago.  She was given a cortisone injection on the more symptomatic right side.  She states that her symptoms are somewhat improved but she continues to have pain.\par \par On the right, her pain is a 2/10 at rest, and is 6/10 while she types. She has no numbness or tingling. She has been wearing a brace "most of the day and night." On the left, she only braces at night. She states that her right hand pain is greater than the left. \par \par \par She previously had EMGs which demonstrated mild bilateral carpal tunnel syndrome.

## 2020-01-08 NOTE — DISCUSSION/SUMMARY
[FreeTextEntry1] : I had a discussion regarding today's visit, the diagnosis and treatment recommendations / options.  At this time she opted for a repeat cortisone injection.  She understands that if this does not provide her with long-term relief, then I would not recommend repeat injections in the future.  If her symptoms do not improve with this injection, and she continues to have pain, then I will order an MRI of her right wrist to better evaluate her persistent complaints of pain.\par \par The patient has agreed to the above plan of management and has expressed full understanding.  All questions were fully answered to the patient's satisfaction.\par \par I spent at least 25 minutes of face-to-face time with the patient.  Over 50% of this time was spent on counseling the patient on the above diagnosis, treatment plan and prognosis.

## 2020-01-29 ENCOUNTER — NON-APPOINTMENT (OUTPATIENT)
Age: 50
End: 2020-01-29

## 2020-01-29 ENCOUNTER — APPOINTMENT (OUTPATIENT)
Dept: CARDIOLOGY | Facility: CLINIC | Age: 50
End: 2020-01-29
Payer: COMMERCIAL

## 2020-01-29 VITALS
DIASTOLIC BLOOD PRESSURE: 85 MMHG | WEIGHT: 115 LBS | HEIGHT: 61 IN | SYSTOLIC BLOOD PRESSURE: 124 MMHG | OXYGEN SATURATION: 94 % | BODY MASS INDEX: 21.71 KG/M2 | HEART RATE: 78 BPM

## 2020-01-29 DIAGNOSIS — I10 ESSENTIAL (PRIMARY) HYPERTENSION: ICD-10-CM

## 2020-01-29 PROCEDURE — 93000 ELECTROCARDIOGRAM COMPLETE: CPT

## 2020-01-29 PROCEDURE — 99213 OFFICE O/P EST LOW 20 MIN: CPT

## 2020-01-29 NOTE — REVIEW OF SYSTEMS
[Headache] : headache [Palpitations] : palpitations [Joint Pain] : joint pain [Knee Pain] : knee pain [Negative] : Endocrine [Cough] : cough

## 2020-01-29 NOTE — HISTORY OF PRESENT ILLNESS
[FreeTextEntry1] : Mrs. Melody Peña is a 49 year old woman in generally good health first observed to have hypertension about 6 months ago. Recently started medication. There is a family history of hypertension and mother had cerebral aneurysm, father unexplained sudden death. She is active, participates regularly in [employer] and keeps up with everybody.\par \par Reports that recently feeling unwell checked blood pressure which was about 168/115. Saw an internist who added quinapril to regimen. She continues to feel vaguely unwell, blurry vision in afternoon, but exercises regularly, Cheri and has no problem keeping up.

## 2020-01-29 NOTE — CARDIOLOGY SUMMARY
[___] : [unfilled] [LVEF ___%] : LVEF [unfilled]% [None] : no pulmonary hypertension [Mild] : mild mitral regurgitation [Normal] : normal LA size

## 2020-01-29 NOTE — PHYSICAL EXAM
[General Appearance - Well Developed] : well developed [Normal Appearance] : normal appearance [Well Groomed] : well groomed [General Appearance - Well Nourished] : well nourished [General Appearance - In No Acute Distress] : no acute distress [Normal Conjunctiva] : the conjunctiva exhibited no abnormalities [No Deformities] : no deformities [Eyelids - No Xanthelasma] : the eyelids demonstrated no xanthelasmas [No Oral Pallor] : no oral pallor [Normal Oral Mucosa] : normal oral mucosa [No Oral Cyanosis] : no oral cyanosis [Normal Jugular Venous A Waves Present] : normal jugular venous A waves present [Normal Jugular Venous V Waves Present] : normal jugular venous V waves present [Respiration, Rhythm And Depth] : normal respiratory rhythm and effort [No Jugular Venous Dc A Waves] : no jugular venous dc A waves [Auscultation Breath Sounds / Voice Sounds] : lungs were clear to auscultation bilaterally [Exaggerated Use Of Accessory Muscles For Inspiration] : no accessory muscle use [Normal Rate] : normal [5th Left ICS - MCL] : palpated at the 5th LICS in the midclavicular line [Rhythm Regular] : regular [Normal] : normal [Normal S1] : normal S1 [No Gallop] : no gallop heard [Normal S2] : normal S2 [Click] : a ~M click was heard [Medium] : medium pitched [II] : a grade 2 [Mid] : mid [2+] : left 2+ [No Pitting Edema] : no pitting edema present [No Abnormalities] : the abdominal aorta was not enlarged and no bruit was heard [Abdomen Tenderness] : non-tender [Abdomen Soft] : soft [Abdomen Mass (___ Cm)] : no abdominal mass palpated [Abnormal Walk] : normal gait [Gait - Sufficient For Exercise Testing] : the gait was sufficient for exercise testing [Nail Clubbing] : no clubbing of the fingernails [Cyanosis, Localized] : no localized cyanosis [Petechial Hemorrhages (___cm)] : no petechial hemorrhages [] : no rash [Skin Color & Pigmentation] : normal skin color and pigmentation [No Venous Stasis] : no venous stasis [No Skin Ulcers] : no skin ulcer [Skin Lesions] : no skin lesions [No Xanthoma] : no  xanthoma was observed [Affect] : the affect was normal [Oriented To Time, Place, And Person] : oriented to person, place, and time [Mood] : the mood was normal [No Anxiety] : not feeling anxious [Right Carotid Bruit] : no bruit heard over the right carotid [Left Carotid Bruit] : no bruit heard over the left carotid

## 2020-01-29 NOTE — DISCUSSION/SUMMARY
[Essential Hypertension] : essential hypertension [Responding to Treatment] : responding to treatment [None] : none [Patient] : the patient [de-identified] : continue amlodipine 10 mg daily and quinapril 5 mg

## 2020-02-03 ENCOUNTER — APPOINTMENT (OUTPATIENT)
Dept: COLORECTAL SURGERY | Facility: CLINIC | Age: 50
End: 2020-02-03
Payer: COMMERCIAL

## 2020-02-03 DIAGNOSIS — K60.2 ANAL FISSURE, UNSPECIFIED: ICD-10-CM

## 2020-02-03 PROCEDURE — 99213 OFFICE O/P EST LOW 20 MIN: CPT | Mod: 25

## 2020-02-03 PROCEDURE — 46600 DIAGNOSTIC ANOSCOPY SPX: CPT

## 2020-02-03 NOTE — PHYSICAL EXAM
[Normal rectal exam] : exam was normal [Posterior] : posteriorly [Excoriation] : no perianal excoriation [Multiple Sinus Tracts] : no perianal sinus tracts [Fistula] : no fistulas [Wart] : no warts [Ulcer ___ cm] : no ulcers [Pilonidal Cyst] : no pilonidal cysts [Pilonidal Sinus] : no pilonidal sinus [Pilonidal Sinus Draining] : no pilonidal sinus drainage [Nonprolapsing] : a nonprolapsing (grade I) [Tender, Swollen] : tender, swollen [Skin Tags] : residual hemorrhoidal skin tags were noted [Thrombosed] : that was not thrombosed [Normal] : was normal [Normal Breath Sounds] : Normal breath sounds [Normal Heart Sounds] : normal heart sounds [Wheezing] : no wheezing was heard [Normal Rate and Rhythm] : normal rate and rhythm [Purpura] : no purpura  [No Rash or Lesion] : No rash or lesion [Petechiae] : no petechiae [Skin Ulcer] : no ulcer [Oriented to Person] : oriented to person [Skin Induration] : no induration [Alert] : alert [Calm] : calm [Oriented to Time] : oriented to time [Oriented to Place] : oriented to place [de-identified] : No apparent distress [de-identified] : Benign abdomen [de-identified] : Anoscopy reveals small external hemorrhoids with posterior midline acute fissure [de-identified] : Pupils equal round and reactive to light and accommodation [de-identified] : FROM

## 2020-02-03 NOTE — HISTORY OF PRESENT ILLNESS
[FreeTextEntry1] : A 49-year-old female with history of hemorrhoids and  rubber band ligation Presents with 2 weeks of sharp pain with defecation. Her outside doctor prescribed for hydrocortisone suppositories which have not helped.

## 2020-02-05 ENCOUNTER — APPOINTMENT (OUTPATIENT)
Dept: ORTHOPEDIC SURGERY | Facility: CLINIC | Age: 50
End: 2020-02-05
Payer: COMMERCIAL

## 2020-02-05 VITALS — WEIGHT: 115 LBS | HEIGHT: 61 IN | BODY MASS INDEX: 21.71 KG/M2

## 2020-02-05 PROCEDURE — 99214 OFFICE O/P EST MOD 30 MIN: CPT

## 2020-02-05 NOTE — PHYSICAL EXAM
[de-identified] : - Constitutional: This is a female in no obvious distress.  \par - Psych: Patient is alert and oriented to person, place and time.  Patient has a normal mood and affect.\par - Cardiovascular: Normal pulses throughout the upper extremities.  No significant varicosities are noted in the upper extremities. \par - Neuro: Strength and sensation are intact throughout the upper extremities.  Patient has normal coordination.\par - Respiratory:  Patient exhibits no evidence of shortness of breath or difficulty breathing.\par - Skin: No rashes, lesions, or other abnormalities are noted in the upper extremities.\par \par ---\par \par Examination of both hands demonstrates no obvious thenar atrophy.  She has normal sensation to light touch throughout the radial, ulnar and median nerve distributions bilaterally.  Provocative signs for carpal tunnel syndrome are partially positive on the right side and negative on the left.  She does have some difficulty with abduction of the right thumb and some weakness of the thenar muscles.  There is no evidence of a trigger finger.  She has full flexion and extension of the digits.  She has complaints of some pain along the dorsal aspect of the right hand.  There is no localized swelling or tenderness dorsally.  There is also some tenderness along the flexor tendons at the wrist flexion crease. 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

## 2020-02-05 NOTE — HISTORY OF PRESENT ILLNESS
[FreeTextEntry1] : 4 weeks status post right carpal tunnel cortisone injection #2.\par \par She states that she has difficulty holding things as well as writing. She denies numbness and tingling. She still rings out her hands. She states that her pain is more localized to her wrist.  She states that the injections helped partially but not significantly.\par \par She previously had EMGs which demonstrated mild bilateral carpal tunnel syndrome.

## 2020-02-05 NOTE — DISCUSSION/SUMMARY
[FreeTextEntry1] : I had a discussion regarding today's visit, the diagnosis and treatment recommendations / options.  At this time, given her persistent symptoms, I recommended that we obtain an MRI of her right wrist. I advised against repeat cortisone injections at this point.  She will follow-up after the MRI to review the results and discuss treatment recommendations.\par \par The patient has agreed to the above plan of management and has expressed full understanding.  All questions were fully answered to the patient's satisfaction.\par \par I spent at least 25 minutes of face-to-face time with the patient.  Over 50% of this time was spent on counseling the patient on the above diagnosis, treatment plan and prognosis.

## 2020-02-05 NOTE — ADDENDUM
[FreeTextEntry1] : I, Juan M Levine, acted solely as a scribe for Dr. Dominick Stevens on 02/05/2020 . \par \par All medical record entries made by the Scribe were at my, Dr. Dominick Stevens, direction and personally dictated by me on 02/05/2020. I have personally reviewed the chart and agree that the record accurately reflects my personal performance of the history, physical exam, assessment and plan.

## 2020-02-11 ENCOUNTER — FORM ENCOUNTER (OUTPATIENT)
Age: 50
End: 2020-02-11

## 2020-02-12 ENCOUNTER — OUTPATIENT (OUTPATIENT)
Dept: OUTPATIENT SERVICES | Facility: HOSPITAL | Age: 50
LOS: 1 days | End: 2020-02-12
Payer: COMMERCIAL

## 2020-02-12 ENCOUNTER — APPOINTMENT (OUTPATIENT)
Dept: MRI IMAGING | Facility: CLINIC | Age: 50
End: 2020-02-12
Payer: COMMERCIAL

## 2020-02-12 DIAGNOSIS — Z00.8 ENCOUNTER FOR OTHER GENERAL EXAMINATION: ICD-10-CM

## 2020-02-12 PROCEDURE — 73221 MRI JOINT UPR EXTREM W/O DYE: CPT | Mod: 26,RT

## 2020-02-12 PROCEDURE — 73221 MRI JOINT UPR EXTREM W/O DYE: CPT

## 2020-02-26 ENCOUNTER — APPOINTMENT (OUTPATIENT)
Dept: ORTHOPEDIC SURGERY | Facility: CLINIC | Age: 50
End: 2020-02-26
Payer: COMMERCIAL

## 2020-02-26 ENCOUNTER — APPOINTMENT (OUTPATIENT)
Dept: DERMATOLOGY | Facility: CLINIC | Age: 50
End: 2020-02-26
Payer: COMMERCIAL

## 2020-02-26 DIAGNOSIS — L71.9 ROSACEA, UNSPECIFIED: ICD-10-CM

## 2020-02-26 PROCEDURE — 99214 OFFICE O/P EST MOD 30 MIN: CPT | Mod: 25

## 2020-02-26 PROCEDURE — 20605 DRAIN/INJ JOINT/BURSA W/O US: CPT | Mod: RT

## 2020-02-26 PROCEDURE — 99213 OFFICE O/P EST LOW 20 MIN: CPT | Mod: GC

## 2020-02-26 RX ORDER — LIDOCAINE HYDROCHLORIDE 10 MG/ML
1 INJECTION, SOLUTION INFILTRATION; PERINEURAL
Refills: 0 | Status: COMPLETED | OUTPATIENT
Start: 2020-02-26

## 2020-02-26 RX ORDER — BETAMETHA AC,SOD PHOS/WATER/PF 6 MG/ML
6 (3-3) VIAL (ML) INJECTION
Qty: 1 | Refills: 0 | Status: COMPLETED | OUTPATIENT
Start: 2020-02-26

## 2020-02-26 RX ADMIN — BETAMETHASONE ACETATE AND BETAMETHASONE SODIUM PHOSPHATE 1 MG/ML: 3; 3 INJECTION, SUSPENSION INTRA-ARTICULAR; INTRALESIONAL; INTRAMUSCULAR; SOFT TISSUE at 00:00

## 2020-02-26 RX ADMIN — LIDOCAINE HYDROCHLORIDE 1 %: 10 INJECTION, SOLUTION INFILTRATION; PERINEURAL at 00:00

## 2020-02-26 NOTE — HISTORY OF PRESENT ILLNESS
[FreeTextEntry1] : 7 weeks status post right carpal tunnel cortisone injection #2.  See note from when she was seen in the office 3 weeks ago.  Because she did not note any improvement and was quite bothered by the right wrist and hand, I ordered an MRI of the right wrist.  She comes in to review the results and discuss treatment recommendations.\par \par She has persistent symptoms.  She states that she has occasional numbness and tingling but is most bothered by the pain in her volar forearm.\par \par She previously had EMGs which demonstrated mild bilateral carpal tunnel syndrome.

## 2020-02-26 NOTE — ADDENDUM
[FreeTextEntry1] : I, Juan M Levine, acted solely as a scribe for Dr. Dominick Stevens on 02/26/2020 . \par \par All medical record entries made by the Scribe were at my, Dr. Dominick Stevens, direction and personally dictated by me on 02/26/2020. I have personally reviewed the chart and agree that the record accurately reflects my personal performance of the history, physical exam, assessment and plan.

## 2020-02-26 NOTE — PHYSICAL EXAM
[de-identified] : - Constitutional: This is a female in no obvious distress.  \par - Psych: Patient is alert and oriented to person, place and time.  Patient has a normal mood and affect.\par - Cardiovascular: Normal pulses throughout the upper extremities.  No significant varicosities are noted in the upper extremities. \par - Neuro: Strength and sensation are intact throughout the upper extremities.  Patient has normal coordination.\par - Respiratory:  Patient exhibits no evidence of shortness of breath or difficulty breathing.\par - Skin: No rashes, lesions, or other abnormalities are noted in the upper extremities.\par \par ---\par \par Examination of both hands demonstrates no obvious thenar atrophy.  She has normal sensation to light touch throughout the radial, ulnar and median nerve distributions bilaterally.  Provocative signs for carpal tunnel syndrome are negative today.  There is no evidence of a trigger finger.  She has full flexion and extension of the digits.  She has tenderness along the flexor tendons palmarly with no obvious swelling.  There is no dorsal tenderness.  She remains neurovascularly intact distally. [de-identified] : I reviewed the MRI of her right wrist which demonstrated a tiny volar ganglion cyst.\par \par AP, lateral oblique radiographs of her right hand demonstrate mild CMC joint arthritis of the thumb.

## 2020-02-26 NOTE — DISCUSSION/SUMMARY
[FreeTextEntry1] : I reviewed the MRI results with her.  I had a discussion regarding today's visit, the diagnosis and treatment recommendations / options.  I explained to her that I am not certain as to the etiology of her symptoms.  This may be secondary to overuse/flexor tendinitis which is not significant enough to demonstrate abnormalities on the MRI.  I told her that we could try one more cortisone injection, this time more proximally around the flexor tendons to see if this helps.  She understands that this may not help, and if it does not, I would not recommend any further injections.  She would like to try one more cortisone injection.\par \par The patient has agreed to the above plan of management and has expressed full understanding.  All questions were fully answered to the patient's satisfaction.\par \par I spent at least 25 minutes of face-to-face time with the patient.  Over 50% of this time was spent on counseling the patient on the above diagnosis, treatment plan and prognosis.

## 2020-02-26 NOTE — PROCEDURE
[FreeTextEntry1] : -  After a discussion of risks and benefits, the patient agreed to proceed with a cortisone injection.  \par -  Side injected: Right wrist flexor tendons.\par -  Medications injected:1 cc of 1% Lidocaine and 1 cc of Betamethasone, 6mg/cc, using sterile technique.\par -  Patient tolerated the procedure well, without complications.\par -  Instructions: The patient was was instructed on the use of a carpal tunnel splint, especially at night.\par -  Follow-up: Within 2 weeks to assess response to the injection.

## 2020-03-06 PROBLEM — G56.02 CARPAL TUNNEL SYNDROME OF LEFT WRIST: Status: ACTIVE | Noted: 2019-09-10

## 2020-03-06 PROBLEM — M77.8 RIGHT WRIST TENDINITIS: Status: ACTIVE | Noted: 2020-02-05

## 2020-03-06 PROBLEM — M77.9 TENDINITIS OF RIGHT HAND: Status: ACTIVE | Noted: 2019-09-10

## 2020-03-11 ENCOUNTER — APPOINTMENT (OUTPATIENT)
Dept: ORTHOPEDIC SURGERY | Facility: CLINIC | Age: 50
End: 2020-03-11

## 2020-03-11 DIAGNOSIS — G56.02 CARPAL TUNNEL SYNDROME, LEFT UPPER LIMB: ICD-10-CM

## 2020-03-11 DIAGNOSIS — M77.8 OTHER ENTHESOPATHIES, NOT ELSEWHERE CLASSIFIED: ICD-10-CM

## 2020-03-11 DIAGNOSIS — M77.9 ENTHESOPATHY, UNSPECIFIED: ICD-10-CM

## 2020-04-10 LAB
CYTOLOGY CVX/VAG DOC THIN PREP: NORMAL
HPV HIGH+LOW RISK DNA PNL CVX: NOT DETECTED

## 2020-10-15 ENCOUNTER — APPOINTMENT (OUTPATIENT)
Dept: DISASTER EMERGENCY | Facility: CLINIC | Age: 50
End: 2020-10-15

## 2020-10-15 LAB — SARS-COV-2 N GENE NPH QL NAA+PROBE: NOT DETECTED

## 2020-10-19 ENCOUNTER — APPOINTMENT (OUTPATIENT)
Dept: PULMONOLOGY | Facility: CLINIC | Age: 50
End: 2020-10-19
Payer: COMMERCIAL

## 2020-10-19 VITALS
SYSTOLIC BLOOD PRESSURE: 124 MMHG | DIASTOLIC BLOOD PRESSURE: 80 MMHG | BODY MASS INDEX: 24.17 KG/M2 | TEMPERATURE: 97.9 F | WEIGHT: 128 LBS | HEART RATE: 77 BPM | HEIGHT: 61 IN

## 2020-10-19 PROCEDURE — 94060 EVALUATION OF WHEEZING: CPT

## 2020-10-19 PROCEDURE — 94729 DIFFUSING CAPACITY: CPT

## 2020-10-19 PROCEDURE — 99204 OFFICE O/P NEW MOD 45 MIN: CPT | Mod: 25

## 2020-10-19 PROCEDURE — ZZZZZ: CPT

## 2020-10-19 PROCEDURE — 94726 PLETHYSMOGRAPHY LUNG VOLUMES: CPT

## 2020-10-19 RX ORDER — NITROFURANTOIN (MONOHYDRATE/MACROCRYSTALS) 25; 75 MG/1; MG/1
100 CAPSULE ORAL
Qty: 14 | Refills: 0 | Status: DISCONTINUED | COMMUNITY
Start: 2020-01-26 | End: 2020-10-19

## 2020-10-19 RX ORDER — QUETIAPINE FUMARATE 25 MG/1
25 TABLET ORAL
Qty: 90 | Refills: 0 | Status: DISCONTINUED | COMMUNITY
Start: 2019-05-31 | End: 2020-10-19

## 2020-10-19 RX ORDER — AMLODIPINE BESYLATE 5 MG/1
5 TABLET ORAL
Qty: 60 | Refills: 0 | Status: DISCONTINUED | COMMUNITY
Start: 2019-10-16 | End: 2020-10-19

## 2020-10-19 RX ORDER — TRAZODONE HYDROCHLORIDE 50 MG/1
50 TABLET ORAL
Qty: 30 | Refills: 0 | Status: DISCONTINUED | COMMUNITY
Start: 2020-01-22 | End: 2020-10-19

## 2020-10-19 RX ORDER — HYDROCORTISONE 25 MG/G
2.5 CREAM TOPICAL
Qty: 1 | Refills: 6 | Status: DISCONTINUED | COMMUNITY
Start: 2020-02-03 | End: 2020-10-19

## 2020-10-19 RX ORDER — DOXEPIN HYDROCHLORIDE 10 MG/1
10 CAPSULE ORAL
Qty: 30 | Refills: 0 | Status: DISCONTINUED | COMMUNITY
Start: 2020-01-13 | End: 2020-10-19

## 2020-10-19 NOTE — PHYSICAL EXAM
[No Acute Distress] : no acute distress [Normal Oropharynx] : normal oropharynx [No Neck Mass] : no neck mass [Normal S1, S2] : normal s1, s2 [Normal Rate/Rhythm] : normal rate/rhythm [No Murmurs] : no murmurs [No Resp Distress] : no resp distress [Clear to Auscultation Bilaterally] : clear to auscultation bilaterally [No Abnormalities] : no abnormalities [Benign] : benign [Normal Gait] : normal gait [No Clubbing] : no clubbing [No Cyanosis] : no cyanosis [FROM] : FROM [Normal Color/ Pigmentation] : normal color/ pigmentation [No Focal Deficits] : no focal deficits [Oriented x3] : oriented x3 [Normal Affect] : normal affect [Well Nourished] : well nourished [Normal Appearance] : normal appearance [Well Groomed] : well groomed [Well Developed] : well developed [No JVD] : no jvd [I] : Mallampati Class: I [Supple] : supple [Normal Pulses] : normal pulses [No Acc Muscle Use] : no acc muscle use [No Edema] : no edema [Soft] : soft [Not Tender] : not tender

## 2020-10-19 NOTE — HISTORY OF PRESENT ILLNESS
[Never] : never [TextBox_4] : Ms. Peña is a 50-year-old female with a history of asthma, HTN, and depression with anxiety who presents for evaluation. She recently had COVID in March. She went to PMD with symptoms of fever, dyspnea, diarrhea, myalgias, and nausea. She did not require any therapy or supplemental oxygen. Not hospitalized. \par \par Since then, she reports recovering well. She just got her sense of smell back after 7 months. She reports episodes of dyspnea and inability to catch her breath. Also with episodes of inability to take a deep breath. She has significant anxiety that she says are likely contributing to her symptoms. She reports episodes of hyperventilation with her clients (she works as a psychotherapist). She was given an albuterol inhaler by her PMD, which she reports significantly improving her dyspnea. Reports associated chest tightness. No wheezing. Has nocturnal symptoms if she does not use her albuterol. She has chronic rhinorrhea, but she does not take any nasal sprays for her rhinorrhea. Reports a chronic dry cough. Denies frequent throat clearing. No significant acid reflux or heartburn. Son recently acquired 2 guinea pigs, but symptoms were present from previously. Has carpets at home, but these were just deep cleaned. She reports skin testing previously positive for dust mites.\par \par She exercises regularly with a . Denies dyspnea with regular exertion, but develops dyspnea during training as expected.\par \par

## 2020-10-19 NOTE — CONSULT LETTER
[Consult Letter:] : I had the pleasure of evaluating your patient, [unfilled]. [Dear  ___] : Dear  [unfilled], [Please see my note below.] : Please see my note below. [Consult Closing:] : Thank you very much for allowing me to participate in the care of this patient.  If you have any questions, please do not hesitate to contact me. [Sincerely,] : Sincerely, [FreeTextEntry2] : Dr. Isaac Gale MD\par Fax: [phone] [FreeTextEntry3] : Corky Wilder MD\par Attending Physician in Pulmonary & Critical Care Medicine\par  of Medicine\par [employer] [facility]

## 2020-10-19 NOTE — ASSESSMENT
[FreeTextEntry1] : Ms. Peña is a 50-year-old female with a history of asthma, HTN, and depression with anxiety who presents for evaluation. She recently had COVID in March with symptoms of fever, dyspnea, loss of smell, diarrhea, myalgias, and nausea. She did not require any therapy or supplemental oxygen and was not hospitalized. Since then, she reports episodes of dyspnea, inability to catch her breath or take a deep breath, and associated chest tightness and nocturnal symptoms. Reports improvement with albuterol. Has known allergies to dust mites, and she has carpets that were recently deep cleaned. Also with chronic rhinorrhea likely due to allergic rhinitis.\par \par 1. Mild persistent asthma:\par - PFTs today with normal spirometry without BD response. Suggestion of small airways disease given reduced [id number] that improves significantly with albuterol. Low normal lung volumes. Normal diffusing capacity\par - Clinical history suggestive of asthma, especially given chest tightness and nocturnal symptoms with reported improvement with albuterol\par - Start Symbicort 80-4.5 mcg 2 puffs twice daily, rinse after use\par - Continue albuterol inhaler prn dyspnea/chest tightness\par - Consider removing carpets at home\par - If symptoms persist or worsen, will consider checking CBC with diff, IgE, and serum allergy testing\par - Continue exercise as tolerates (exercises with  regularly)\par \par 2. Allergic rhinitis:\par - History of skin allergy testing positive for dust mites and alternaria mold\par - Start fluticasone nasal spray twice daily\par \par 3. HCM:\par - Up to date with influenza vaccination\par - Follow-up in 2 months to assess for improvement in symptoms with Symbicort

## 2020-10-21 ENCOUNTER — APPOINTMENT (OUTPATIENT)
Dept: ORTHOPEDIC SURGERY | Facility: CLINIC | Age: 50
End: 2020-10-21
Payer: COMMERCIAL

## 2020-10-21 VITALS
HEART RATE: 87 BPM | SYSTOLIC BLOOD PRESSURE: 116 MMHG | WEIGHT: 128 LBS | BODY MASS INDEX: 24.17 KG/M2 | HEIGHT: 61 IN | DIASTOLIC BLOOD PRESSURE: 74 MMHG

## 2020-10-21 PROCEDURE — 99214 OFFICE O/P EST MOD 30 MIN: CPT

## 2020-10-21 PROCEDURE — 99072 ADDL SUPL MATRL&STAF TM PHE: CPT

## 2020-10-21 PROCEDURE — 73130 X-RAY EXAM OF HAND: CPT | Mod: RT

## 2020-11-09 ENCOUNTER — APPOINTMENT (OUTPATIENT)
Dept: ORTHOPEDIC SURGERY | Facility: CLINIC | Age: 50
End: 2020-11-09
Payer: COMMERCIAL

## 2020-11-09 VITALS — TEMPERATURE: 98.1 F

## 2020-11-09 DIAGNOSIS — M47.812 SPONDYLOSIS W/OUT MYELOPATHY OR RADICULOPATHY, CERVICAL REGION: ICD-10-CM

## 2020-11-09 PROCEDURE — 99213 OFFICE O/P EST LOW 20 MIN: CPT

## 2020-11-09 PROCEDURE — 72050 X-RAY EXAM NECK SPINE 4/5VWS: CPT

## 2020-11-09 PROCEDURE — 99072 ADDL SUPL MATRL&STAF TM PHE: CPT

## 2020-11-09 NOTE — HISTORY OF PRESENT ILLNESS
[de-identified] : This is a 50-year-old female that is here today to discuss her neck and right arm symptoms.  She states that she has majority 80% arm pain and 20% neck pain.  The pain travels down her right lateral shoulder into her lateral forearm and into her hand diffusely.  She recently had to take a course of prednisone which should help her symptoms.  Unfortunately she is still having quite a bit of symptoms on her right arm.  Her pain is worse when she lays down.  Of note she did does have a known history of carpal tunnel syndrome which has improved her hand numbness with injections.  Due to the fact that she has this diffuse arm pain she was sent to spine clinic for further evaluation of her cervical spine.  This pain down her arms is interfering with her activities of daily living.

## 2020-11-09 NOTE — ASSESSMENT
[FreeTextEntry1] : This is a 50-year-old female that is here today for evaluation of her right-sided arm pain and neck pain.  Given the extent of her pain and the fact that she has developed some weakness in her biceps and wrist flexion I would like to proceed with a cervical MRI.  An order for this is been placed today.  I have also prescribed her a course of goal-directed physical therapy, including isometric neck exercises.  I have also prescribed her a short course of tizanidine and went over appropriate dosing of Tylenol.  I will have her follow-up in 2 to 3 weeks for repeat clinical evaluation and go over imaging.  She knows to call in interim if any of her symptoms worsen.

## 2020-11-09 NOTE — PHYSICAL EXAM
[de-identified] : Cervical Physical Exam\par \par Gait -normal\par \par Station -normal\par \par Sagittal balance -normal\par \par Compensatory mechanism? -None\par \par Horizontal gaze -maintain\par \par Heel walk -normal\par \par Toe walk -normal\par \par Reflexes\par Biceps -hyper reflexive\par Triceps -hyperreflexic\par Brachioradialis -hyperreflexic\par Patellar -normal\par Gastroc -normal\par Clonus -no\par \par Evans´s -none\par \par Shoulder Exam -normal\par \par Spurling´s -positive bilaterally\par \par Wrist Pulses -2+ radial/ulnar\par \par Foot Pulses -2+ DP/PT\par \par Cervical range of motion -globally reduced\par \par Sensation \par C5-T1 sensation intact to light touch bilaterally\par \par L1-S1 sensation intact to light touch bilaterally\par \par Motor\par \par \par 	Deltoid	Biceps	Triceps	WF	WE	IO	\par Right	5/5	4/5	5/5	4/5	5/5	5/5	5/5\par Left	5/5	5/5	5/5	5/5	5/5	5/5	5/5\par \par \par 	IP	Quad	HS	TA	Gastroc	EHL\par Right	5/5	5/5	5/5	5/5	5/5	5/5\par Left	5/5	5/5	5/5	5/5	5/5	5/5\par \par \par  [de-identified] : Cervical radiographs\par Diffuse degenerative disc disease\par C4-C5 with significant disc degeneration\par Focal kyphosis noted at C4-C5\par Overall alignment, C SVA is within normal limits

## 2020-11-30 ENCOUNTER — RESULT REVIEW (OUTPATIENT)
Age: 50
End: 2020-11-30

## 2020-11-30 ENCOUNTER — OUTPATIENT (OUTPATIENT)
Dept: OUTPATIENT SERVICES | Facility: HOSPITAL | Age: 50
LOS: 1 days | End: 2020-11-30
Payer: COMMERCIAL

## 2020-11-30 ENCOUNTER — APPOINTMENT (OUTPATIENT)
Dept: MRI IMAGING | Facility: CLINIC | Age: 50
End: 2020-11-30
Payer: COMMERCIAL

## 2020-11-30 DIAGNOSIS — M47.812 SPONDYLOSIS WITHOUT MYELOPATHY OR RADICULOPATHY, CERVICAL REGION: ICD-10-CM

## 2020-11-30 PROCEDURE — 72141 MRI NECK SPINE W/O DYE: CPT | Mod: 26

## 2020-11-30 PROCEDURE — 72141 MRI NECK SPINE W/O DYE: CPT

## 2020-12-14 ENCOUNTER — APPOINTMENT (OUTPATIENT)
Dept: ORTHOPEDIC SURGERY | Facility: CLINIC | Age: 50
End: 2020-12-14
Payer: COMMERCIAL

## 2020-12-14 VITALS — TEMPERATURE: 97.2 F

## 2020-12-14 DIAGNOSIS — M54.12 RADICULOPATHY, CERVICAL REGION: ICD-10-CM

## 2020-12-14 PROCEDURE — 99214 OFFICE O/P EST MOD 30 MIN: CPT

## 2020-12-14 PROCEDURE — 99072 ADDL SUPL MATRL&STAF TM PHE: CPT

## 2020-12-14 NOTE — PHYSICAL EXAM
[de-identified] : Cervical Physical Exam\par \par Gait -normal\par \par Station -normal\par \par Sagittal balance -normal\par \par Compensatory mechanism? -None\par \par Horizontal gaze -maintain\par \par Heel walk -normal\par \par Toe walk -normal\par \par Reflexes\par Biceps -hyper reflexive\par Triceps -hyperreflexic\par Brachioradialis -hyperreflexic\par Patellar -normal\par Gastroc -normal\par Clonus -no\par \par Evans´s -none\par \par Shoulder Exam -normal\par \par Spurling´s -positive bilaterally\par \par Wrist Pulses -2+ radial/ulnar\par \par Foot Pulses -2+ DP/PT\par \par Cervical range of motion -globally reduced\par \par Sensation \par C5-T1 sensation intact to light touch bilaterally\par \par L1-S1 sensation intact to light touch bilaterally\par \par Motor\par \par \par 	Deltoid	Biceps	Triceps	WF	WE	IO	\par Right	5/5	4/5	5/5	4/5	5/5	5/5	5/5\par Left	5/5	5/5	5/5	5/5	5/5	5/5	5/5\par \par \par 	IP	Quad	HS	TA	Gastroc	EHL\par Right	5/5	5/5	5/5	5/5	5/5	5/5\par Left	5/5	5/5	5/5	5/5	5/5	5/5\par \par Exam unchanged today.  She still has some weakness on her right upper extremity\par  [de-identified] : Cervical MRI\par C5-C6 with severe disc degeneration, left-sided foraminal narrowing is worse than the right\par C6-C7 with a left-sided indentation of the thecal sac\par Possible OPLL behind the C6 vertebral body

## 2020-12-14 NOTE — HISTORY OF PRESENT ILLNESS
[de-identified] : This is a 50-year-old female here today for reevaluation of her neck and arm pain.  She states that the majority of her symptoms are down her right arm into her right forearm into and into her right index finger and thumb.  Today however all of her fingers and her thumb on her right hand is hurting.  She also notes that she has had some posterior lateral left shoulder pain that has been chronic as well.  She denies any issues with dropping objects, hand dexterity, buttoning her shirts.  At times she has some issues with balance.  She is here to discuss her MRI results.

## 2020-12-14 NOTE — ASSESSMENT
[FreeTextEntry1] : I had a lengthy discussion with the patient in regards to her symptoms and diagnosis.  She does have signs of cervical spondylosis as well as foraminal narrowing.  Although this is worse on the left side as compared to the right she does still have some findings on her MRI which could very well cause her right-sided radiculopathy.  As result I would like her to get a C5-C6 interlaminar epidural steroid injection.  I gave her appropriate information in order to help get this scheduled.  I advised her to reach out to my office if there are any issues with scheduling.  We will have her follow-up in approximately 3 weeks for repeat clinical evaluation.  She knows to call back sooner if her symptoms change or worsen in any way.

## 2020-12-24 ENCOUNTER — OUTPATIENT (OUTPATIENT)
Dept: OUTPATIENT SERVICES | Facility: HOSPITAL | Age: 50
LOS: 1 days | End: 2020-12-24
Payer: COMMERCIAL

## 2020-12-24 ENCOUNTER — APPOINTMENT (OUTPATIENT)
Dept: RADIOLOGY | Facility: CLINIC | Age: 50
End: 2020-12-24
Payer: COMMERCIAL

## 2020-12-24 ENCOUNTER — APPOINTMENT (OUTPATIENT)
Dept: ENDOCRINOLOGY | Facility: CLINIC | Age: 50
End: 2020-12-24

## 2020-12-24 DIAGNOSIS — Z00.8 ENCOUNTER FOR OTHER GENERAL EXAMINATION: ICD-10-CM

## 2020-12-24 PROCEDURE — 73030 X-RAY EXAM OF SHOULDER: CPT | Mod: 26,50

## 2020-12-24 PROCEDURE — 73030 X-RAY EXAM OF SHOULDER: CPT

## 2021-01-07 ENCOUNTER — APPOINTMENT (OUTPATIENT)
Dept: ORTHOPEDIC SURGERY | Facility: CLINIC | Age: 51
End: 2021-01-07

## 2021-01-12 ENCOUNTER — APPOINTMENT (OUTPATIENT)
Dept: ORTHOPEDIC SURGERY | Facility: CLINIC | Age: 51
End: 2021-01-12
Payer: COMMERCIAL

## 2021-01-12 VITALS — HEIGHT: 61 IN | BODY MASS INDEX: 24.17 KG/M2 | WEIGHT: 128 LBS

## 2021-01-12 PROCEDURE — 99214 OFFICE O/P EST MOD 30 MIN: CPT

## 2021-01-12 PROCEDURE — 99072 ADDL SUPL MATRL&STAF TM PHE: CPT

## 2021-02-02 ENCOUNTER — OUTPATIENT (OUTPATIENT)
Dept: OUTPATIENT SERVICES | Facility: HOSPITAL | Age: 51
LOS: 1 days | End: 2021-02-02

## 2021-02-02 DIAGNOSIS — Z00.8 ENCOUNTER FOR OTHER GENERAL EXAMINATION: ICD-10-CM

## 2021-02-16 ENCOUNTER — OUTPATIENT (OUTPATIENT)
Dept: OUTPATIENT SERVICES | Facility: HOSPITAL | Age: 51
LOS: 1 days | End: 2021-02-16
Payer: COMMERCIAL

## 2021-02-16 ENCOUNTER — APPOINTMENT (OUTPATIENT)
Dept: ULTRASOUND IMAGING | Facility: IMAGING CENTER | Age: 51
End: 2021-02-16
Payer: COMMERCIAL

## 2021-02-16 DIAGNOSIS — Z80.8 FAMILY HISTORY OF MALIGNANT NEOPLASM OF OTHER ORGANS OR SYSTEMS: ICD-10-CM

## 2021-02-16 PROCEDURE — 76536 US EXAM OF HEAD AND NECK: CPT | Mod: 26

## 2021-02-16 PROCEDURE — 76536 US EXAM OF HEAD AND NECK: CPT

## 2021-02-18 ENCOUNTER — OUTPATIENT (OUTPATIENT)
Dept: OUTPATIENT SERVICES | Facility: HOSPITAL | Age: 51
LOS: 1 days | End: 2021-02-18
Payer: COMMERCIAL

## 2021-02-18 VITALS
OXYGEN SATURATION: 97 % | SYSTOLIC BLOOD PRESSURE: 118 MMHG | HEART RATE: 71 BPM | WEIGHT: 132.06 LBS | RESPIRATION RATE: 16 BRPM | DIASTOLIC BLOOD PRESSURE: 78 MMHG | HEIGHT: 61 IN | TEMPERATURE: 97 F

## 2021-02-18 DIAGNOSIS — Z98.890 OTHER SPECIFIED POSTPROCEDURAL STATES: Chronic | ICD-10-CM

## 2021-02-18 DIAGNOSIS — G56.01 CARPAL TUNNEL SYNDROME, RIGHT UPPER LIMB: ICD-10-CM

## 2021-02-18 DIAGNOSIS — F41.9 ANXIETY DISORDER, UNSPECIFIED: ICD-10-CM

## 2021-02-18 DIAGNOSIS — G56.00 CARPAL TUNNEL SYNDROME, UNSPECIFIED UPPER LIMB: ICD-10-CM

## 2021-02-18 LAB
ALBUMIN SERPL ELPH-MCNC: 4.1 G/DL — SIGNIFICANT CHANGE UP (ref 3.3–5)
ALP SERPL-CCNC: 45 U/L — SIGNIFICANT CHANGE UP (ref 40–120)
ALT FLD-CCNC: 13 U/L — SIGNIFICANT CHANGE UP (ref 4–33)
ANION GAP SERPL CALC-SCNC: 9 MMOL/L — SIGNIFICANT CHANGE UP (ref 7–14)
AST SERPL-CCNC: 14 U/L — SIGNIFICANT CHANGE UP (ref 4–32)
BILIRUB SERPL-MCNC: 0.5 MG/DL — SIGNIFICANT CHANGE UP (ref 0.2–1.2)
BUN SERPL-MCNC: 13 MG/DL — SIGNIFICANT CHANGE UP (ref 7–23)
CALCIUM SERPL-MCNC: 9.4 MG/DL — SIGNIFICANT CHANGE UP (ref 8.4–10.5)
CHLORIDE SERPL-SCNC: 102 MMOL/L — SIGNIFICANT CHANGE UP (ref 98–107)
CO2 SERPL-SCNC: 26 MMOL/L — SIGNIFICANT CHANGE UP (ref 22–31)
CREAT SERPL-MCNC: 0.76 MG/DL — SIGNIFICANT CHANGE UP (ref 0.5–1.3)
GLUCOSE SERPL-MCNC: 88 MG/DL — SIGNIFICANT CHANGE UP (ref 70–99)
HCG UR QL: NEGATIVE — SIGNIFICANT CHANGE UP
HCT VFR BLD CALC: 38.7 % — SIGNIFICANT CHANGE UP (ref 34.5–45)
HGB BLD-MCNC: 12.2 G/DL — SIGNIFICANT CHANGE UP (ref 11.5–15.5)
MCHC RBC-ENTMCNC: 31.3 PG — SIGNIFICANT CHANGE UP (ref 27–34)
MCHC RBC-ENTMCNC: 31.5 GM/DL — LOW (ref 32–36)
MCV RBC AUTO: 99.2 FL — SIGNIFICANT CHANGE UP (ref 80–100)
NRBC # BLD: 0 /100 WBCS — SIGNIFICANT CHANGE UP
NRBC # FLD: 0 K/UL — SIGNIFICANT CHANGE UP
PLATELET # BLD AUTO: 261 K/UL — SIGNIFICANT CHANGE UP (ref 150–400)
POTASSIUM SERPL-MCNC: 3.9 MMOL/L — SIGNIFICANT CHANGE UP (ref 3.5–5.3)
POTASSIUM SERPL-SCNC: 3.9 MMOL/L — SIGNIFICANT CHANGE UP (ref 3.5–5.3)
PROT SERPL-MCNC: 6.7 G/DL — SIGNIFICANT CHANGE UP (ref 6–8.3)
RBC # BLD: 3.9 M/UL — SIGNIFICANT CHANGE UP (ref 3.8–5.2)
RBC # FLD: 12.7 % — SIGNIFICANT CHANGE UP (ref 10.3–14.5)
SODIUM SERPL-SCNC: 137 MMOL/L — SIGNIFICANT CHANGE UP (ref 135–145)
WBC # BLD: 7.15 K/UL — SIGNIFICANT CHANGE UP (ref 3.8–10.5)
WBC # FLD AUTO: 7.15 K/UL — SIGNIFICANT CHANGE UP (ref 3.8–10.5)

## 2021-02-18 PROCEDURE — 93010 ELECTROCARDIOGRAM REPORT: CPT

## 2021-02-18 NOTE — H&P PST ADULT - MUSCULOSKELETAL COMMENTS
Pt c/o of cervical neck stiffness Pt c/o of cervical neck stiffness. Hx of right carpal tunnel syndrome and pain with use

## 2021-02-18 NOTE — H&P PST ADULT - NSICDXPASTMEDICALHX_GEN_ALL_CORE_FT
PAST MEDICAL HISTORY:  Anxiety     Carpal tunnel syndrome right    HTN (hypertension)      PAST MEDICAL HISTORY:  Anxiety     Carpal tunnel syndrome right    COVID-19     HTN (hypertension)

## 2021-02-18 NOTE — H&P PST ADULT - NSICDXPASTSURGICALHX_GEN_ALL_CORE_FT
PAST SURGICAL HISTORY:  H/O arthroscopy b/l knee    H/O plastic surgery 2020 - Breast implants, Past hx eye lid , tummy tuck

## 2021-02-18 NOTE — H&P PST ADULT - MALLAMPATI CLASS
with phonation/Class II - visualization of the soft palate, fauces, and uvula with phonation/Class III - visualization of the soft palate and the base of the uvula

## 2021-02-18 NOTE — H&P PST ADULT - HISTORY OF PRESENT ILLNESS
Pt is a 50 yr old female scheduled for Right Carpal Tunnel Release with Dr Muro tentatively 2/25/21 -  Pt is a 50 yr old female scheduled for Right Carpal Tunnel Release with Dr Muro tentatively 2/25/21 - Pt c/o of pain right wrist and fingers with use for past year. Pt hx of recent breast implants placement 10/20 - Hx HTN and depression   Pt admits to symptoms of COVID 3/20 and denies recent travel   Pt to have COVID preop test

## 2021-02-18 NOTE — H&P PST ADULT - NSICDXPROBLEM_GEN_ALL_CORE_FT
PROBLEM DIAGNOSES  Problem: Carpal tunnel syndrome  Assessment and Plan: Pt scheduled for surgery and preop instructions including instructions for taking Famotidine and for Chlorhexidine use in showering on the day of surgery, given verbally and with use of  written materials, and patient confirming understanding of such instructions using  teach back   method.  OR booking notified of allergy to PCN, implants       Problem: Anxiety  Assessment and Plan: Pt to take Lamotrigene and Alprazolam am DOS

## 2021-02-22 ENCOUNTER — APPOINTMENT (OUTPATIENT)
Dept: DISASTER EMERGENCY | Facility: CLINIC | Age: 51
End: 2021-02-22

## 2021-02-23 LAB — SARS-COV-2 N GENE NPH QL NAA+PROBE: NOT DETECTED

## 2021-02-24 ENCOUNTER — TRANSCRIPTION ENCOUNTER (OUTPATIENT)
Age: 51
End: 2021-02-24

## 2021-02-24 VITALS
HEIGHT: 61 IN | TEMPERATURE: 98 F | DIASTOLIC BLOOD PRESSURE: 74 MMHG | SYSTOLIC BLOOD PRESSURE: 114 MMHG | HEART RATE: 71 BPM | WEIGHT: 132.06 LBS | RESPIRATION RATE: 18 BRPM | OXYGEN SATURATION: 98 %

## 2021-02-25 ENCOUNTER — OUTPATIENT (OUTPATIENT)
Dept: OUTPATIENT SERVICES | Facility: HOSPITAL | Age: 51
LOS: 1 days | Discharge: HOME | End: 2021-02-25
Payer: COMMERCIAL

## 2021-02-25 ENCOUNTER — APPOINTMENT (OUTPATIENT)
Dept: ORTHOPEDIC SURGERY | Facility: AMBULATORY SURGERY CENTER | Age: 51
End: 2021-02-25

## 2021-02-25 VITALS
HEART RATE: 68 BPM | TEMPERATURE: 98 F | RESPIRATION RATE: 12 BRPM | DIASTOLIC BLOOD PRESSURE: 71 MMHG | OXYGEN SATURATION: 98 % | SYSTOLIC BLOOD PRESSURE: 111 MMHG

## 2021-02-25 DIAGNOSIS — Z98.890 OTHER SPECIFIED POSTPROCEDURAL STATES: Chronic | ICD-10-CM

## 2021-02-25 DIAGNOSIS — G56.01 CARPAL TUNNEL SYNDROME, RIGHT UPPER LIMB: ICD-10-CM

## 2021-02-25 PROCEDURE — 64721 CARPAL TUNNEL SURGERY: CPT | Mod: RT

## 2021-02-25 RX ORDER — ALPRAZOLAM 0.25 MG
1 TABLET ORAL
Qty: 0 | Refills: 0 | DISCHARGE

## 2021-02-25 RX ORDER — AMLODIPINE BESYLATE 2.5 MG/1
1 TABLET ORAL
Qty: 0 | Refills: 0 | DISCHARGE

## 2021-02-25 RX ORDER — LAMOTRIGINE 25 MG/1
0 TABLET, ORALLY DISINTEGRATING ORAL
Qty: 0 | Refills: 0 | DISCHARGE

## 2021-02-25 RX ORDER — ACETAMINOPHEN 500 MG
2 TABLET ORAL
Qty: 0 | Refills: 0 | DISCHARGE

## 2021-02-25 RX ORDER — QUINAPRIL HYDROCHLORIDE 40 MG/1
1 TABLET, FILM COATED ORAL
Qty: 0 | Refills: 0 | DISCHARGE

## 2021-02-25 RX ORDER — IBUPROFEN 200 MG
3 TABLET ORAL
Qty: 0 | Refills: 0 | DISCHARGE

## 2021-02-25 NOTE — ASU DISCHARGE PLAN (ADULT/PEDIATRIC) - CARE PROVIDER_API CALL
Lucero Franz (MD; MPH)  Orthopaedic Surgery  [address], [address]  [place] [address]  Phone: [phone]  Fax: [phone]  Follow Up Time: 2 weeks

## 2021-02-25 NOTE — ASU DISCHARGE PLAN (ADULT/PEDIATRIC) - CALL YOUR DOCTOR IF YOU HAVE ANY OF THE FOLLOWING:
See instruction sheet. See instruction sheet./Bleeding that does not stop/Pain not relieved by Medications/Fever greater than (need to indicate Fahrenheit or Celsius)/Wound/Surgical Site with redness, or foul smelling discharge or pus/Numbness, tingling, color or temperature change to extremity/Nausea and vomiting that does not stop/Inability to tolerate liquids or foods

## 2021-02-25 NOTE — ASU DISCHARGE PLAN (ADULT/PEDIATRIC) - ACTIVITY LEVEL
See instruction sheet. Do not push,pull or carry anything with operative hand. See instruction sheet.

## 2021-02-25 NOTE — ASU PREOP CHECKLIST - VIA
"Occupational Therapy   Date:  5/11/2018  Start Time: 1007  Stop Time:  1101    TIMED  Procedure Time Min.   Manual (0)  7   TherEx (2)  23   Neuro Re-ed (2)   24   Total Timed Minutes: 54  Total Timed Units:  4  Total Untimed Units:  0  Charges Billed/# of units:  4    Progress/Current Status     Subjective:     Patient ID: Neptali Gonzalez is a 66 y.o. male.  Diagnosis:   Encounter Diagnoses   Name Primary?    Stiffness of joint, shoulder region, right     Coordination impairment     Impaired function of upper extremity Yes    Muscle hypertonicity     Right arm weakness     Cognitive impairment       Pain:   Pt states he has been out of town. He has been working on handwriting. He has not been compliant with his HEP.     Objective:     Gentle mobilization and stretching of soft-tissues throughout the shoulder, including levator, anterior complex, pec major, lats, combined internal rotators, and subscapularis to allow for increased PROM.  Marko is able to demo stretch to ER @ table top, but required cues to complete exercise for 30s instead of 10s, and min cues for best positioning.  Marko continues to require max cues for positioning and completion of SLIR. Completed 10x30s; PROM=44*.  Prone on forearms for alternating forward reach to stretch posterior capsule, and improve coordination in scapular stabilizers  External rotation @~45* scaption, AROM, 1#, 2#, 3#, 2#, 1#, AROM x10 each to improve coordination and strength.   Facilitation of forward reach and improved coordination between elbow extension, sh flex and ER w/ tilt-stick in standing against progressive resistance of rubber band chains, 1/2-width yellow and full yellow t-bands.  coordination of active elbow extension, supination and wrist/digit extension with "gary-dah!" movement in purposeful, high-amplitude, low side-reach.    Assessment:     Due to scheduling conflicts and 2 no-shows, this is Marko's 2nd visit since his POC update on 5/25. He " has been able to maintain his previous progress, though he admits to limited compliance with his HEP. He was better able to demonstrate his HEP this visit compared to the last. No additional goals met due to limited visits. Limited compliance with HEP due to decreased memory for correct techniques and for actual completion of the HEP leads to Marko progressing more slowly than would be optimal. 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      LTG GOALS:  Time frame: 12 weeks (2/26/18-5/21)  1) Pt will demonstrate ability to write The cat jumped over the lazy dog legibly w/ the R hand. (progressing)  2) Pt will demonstrate ability to complete the 9HPT with the R UE in under 1:30 (progressing)  3) Pt will demonstrate improvement in R UE gross motor coordination, as evidenced by a B&B score of at least 40. (no change)  4) Pt will report at least 75% compliance w/ HEP. (progressing)  5) Pt will demonstrate ability to legibly write name w/ R UE. (MET)  6) Improve ROM in ER@ 90*abd and SLIR by at least 15* each to improve function throughout the shoulder.  7) Marko will be able to reach overhead with shoulder flex at least 115* without discomfort on 10/10 trials.  8) Marko will demonstrate at least 3+/5 strength in external rotation of the R shoulder to improve indep w/ forward reach.     Patient Education/Response:     Continue HEP, including stretches, handwriting. He v/u.     Plans and Goals:     Continue working on coordination for all tasks.    G-Codes: carry/handle R UE (FMC, GMC, ROM, skilled observation)  Current: 40%-60% (CK)  Goal: 20%-40% (CJ)    Krystle Holloway, ALEXANDRE, LOTR   7/20/2018      ambulate

## 2021-02-25 NOTE — ASU DISCHARGE PLAN (ADULT/PEDIATRIC) - BATHING
[FreeTextEntry1] : - cont  janumet 50/1000mg bid  for now.\par - from endocrine perspectives, patient is cleared for the surgery\par - for 2 weeks preop while on liquid diet, can take only once a day. Preop instructions given. 2 days prior to the sx, pt will be on clear diet and 1 protein meal replacement- she will take only januvia for those 2 days\par - advised on statins, but likely will start post bariatric surgery\par - follow up with Dr. Muirllo. Expect significant improvement in glycemic control with the weight loss.\par - monitor urine microalbumin, blood pressure\par RTC post surgery.  See instruction sheet./No change MUST keep right hand dressing clean and dry for 3 daysSee instruction sheet.

## 2021-03-09 ENCOUNTER — APPOINTMENT (OUTPATIENT)
Dept: ORTHOPEDIC SURGERY | Facility: CLINIC | Age: 51
End: 2021-03-09
Payer: COMMERCIAL

## 2021-03-09 PROBLEM — G56.00 CARPAL TUNNEL SYNDROME, UNSPECIFIED UPPER LIMB: Chronic | Status: ACTIVE | Noted: 2021-02-18

## 2021-03-09 PROBLEM — U07.1 COVID-19: Chronic | Status: ACTIVE | Noted: 2021-02-18

## 2021-03-09 PROBLEM — F41.9 ANXIETY DISORDER, UNSPECIFIED: Chronic | Status: ACTIVE | Noted: 2021-02-18

## 2021-03-09 PROBLEM — I10 ESSENTIAL (PRIMARY) HYPERTENSION: Chronic | Status: ACTIVE | Noted: 2021-02-18

## 2021-03-09 PROCEDURE — 99024 POSTOP FOLLOW-UP VISIT: CPT

## 2021-04-08 ENCOUNTER — RX RENEWAL (OUTPATIENT)
Age: 51
End: 2021-04-08

## 2021-04-19 ENCOUNTER — RX RENEWAL (OUTPATIENT)
Age: 51
End: 2021-04-19

## 2021-05-11 ENCOUNTER — APPOINTMENT (OUTPATIENT)
Dept: ORTHOPEDIC SURGERY | Facility: CLINIC | Age: 51
End: 2021-05-11
Payer: COMMERCIAL

## 2021-05-11 DIAGNOSIS — G56.01 CARPAL TUNNEL SYNDROME, RIGHT UPPER LIMB: ICD-10-CM

## 2021-05-11 PROCEDURE — 99024 POSTOP FOLLOW-UP VISIT: CPT

## 2021-07-06 ENCOUNTER — RX RENEWAL (OUTPATIENT)
Age: 51
End: 2021-07-06

## 2021-10-08 ENCOUNTER — APPOINTMENT (OUTPATIENT)
Dept: GASTROENTEROLOGY | Facility: HOSPITAL | Age: 51
End: 2021-10-08
Payer: COMMERCIAL

## 2021-10-08 DIAGNOSIS — K64.9 UNSPECIFIED HEMORRHOIDS: ICD-10-CM

## 2021-10-08 DIAGNOSIS — K59.09 OTHER CONSTIPATION: ICD-10-CM

## 2021-10-08 DIAGNOSIS — K76.89 OTHER SPECIFIED DISEASES OF LIVER: ICD-10-CM

## 2021-10-08 DIAGNOSIS — D12.6 BENIGN NEOPLASM OF COLON, UNSPECIFIED: ICD-10-CM

## 2021-10-08 DIAGNOSIS — K62.5 HEMORRHAGE OF ANUS AND RECTUM: ICD-10-CM

## 2021-10-08 PROCEDURE — 99203 OFFICE O/P NEW LOW 30 MIN: CPT | Mod: 95

## 2021-10-08 NOTE — ASSESSMENT
[FreeTextEntry1] : 50 yo F pmh constipation c/b h/o hemorrhoids s/p banding, and liver cysts/hemangiomas who presents for follow up for first time in over 3 years.  Due for MRI screening for hepatic cyst.  No warning signs.  Most active is her BRBPR 2/2 hemorrhoids.  Will attempt use of suppository given has not tried.  Extended discussion regarding possible side effects of senna including long term motility issues in colon associated with chronic use.  Has failed other OTC meds, can consider secretagogue therapy down the road if she is amenable.  She will consider and if there loss of efficacy of senna she will let me know.\par \par Impression:\par 1) BRBPR in setting of Hemorrhoids - s/p banding - still active\par 2) Colon polyps - due 2023\par 3) Liver hemangioma/cysts - due for MRI surveillance now\par 4) Constipation - well controlled on senna\par \par Plan:\par 1) MRI of Liver \par 2) Continue with current bowel regimen; risks of long term senna use reviewed\par 3) Colonoscopy 2023\par 4) Trial of Hemmorex-EC x 7 days for bleeding hemorrhoids\par 5) All discussed at length. Plan agreed upon. All questions answered\par 6) RV PRN. \par

## 2021-10-08 NOTE — HISTORY OF PRESENT ILLNESS
[FreeTextEntry1] : Telehealth appointment\par Patient at Home in [facility], Physician in office\par Patient informed about reason for appt and privacy risks associated with technology\par Patient provided verbal consent for telehealth appointment today and had no administrative questions\par ------------------------------------------------------\par \par Last visit: 6/2021\par Hepatic cyst (573.8) (K76.89)\par Chronic constipation (564.00) (K59.09)\par History of colonic polyps (V12.72) (Z86.010)\par History of rectal bleeding (V12.79) (Z87.19)\par \par 48 yo F pmh constipation, h/o hemorrhoids s/p banding, and liver cysts/hemangiomas who presents for follow up. Hemorrhoids/bleeding have resolved with hemorrhoid treatment. She had 1 tubular adenoma on colonoscopy and is due for f/u in 5 years. She is due for follow up of hepatic cysts per radiology at this time, will order today.\par \par Impression:\par 1) BRBPR - resolved\par 2) Hemorrhoids - resolved\par 3) Colon polyps - due 2023\par 4) Liver hemangioma/cysts - due for MRI surveillance now\par 5) Constipation - well controlled\par \par Plan:\par 1) MRI of Liver \par 2) Continue with current bowel regimen\par 3) Colonoscopy 2023\par 4) All discussed at length. Plan agreed upon. All questions answered\par 5) RV PRN. \par \par ------------------------------------------------------------------------------------------------------------\par Since last visit:\par Had MRI 2018, 2019\par \par Currently:\par \par Colon Polyps - due 2023\par \par Liver lesions - due for MRI surveillance 2021\par No abdominal pain, n/v, jaundice, fevers chills\par \par Constipation - \par Taking Senna near daily\par Has been on this for years\par Results in BM every 1-2 days\par Tolerates well with no cramps\par Has not tried other secretagogues, hesitant to at this point given less long term data\par Rare blood from the hemorrhoids - these are more active currently as well\par \par ----------------------------------------------------------------------------------\par Prior Workup:\par \par MRI 2019\par IMPRESSION: \par \par Multiple hepatic cysts and hemangiomas. \par Consider follow-up evaluation in 2 years time for an indeterminant 1 cm \par nodular focus of the right hepatic dome. \par No additional developing hepatic mass. \par \par \par MRI 2018\par IMPRESSION: \par Stable hepatic cysts and hemangiomata. \par Single indeterminant focus of the right hepatic dome stable compared with \par the patient's prior studies. \par Suggest follow-up evaluation in one year. \par \par \par CR Surgery 2019\par Chronic constipation (564.00) (K59.09)\par Hemorrhoids (455.6) (K64.9)\par Rectal hemorrhage (569.3) (K62.5)\par \par Bleeding internal hemorrhoids\par -Rubber band ligation was performed on the right anterior internal hemorrhoid without complication\par -Patient to monitor results. If persistent bleeding will followup for additional banding.\par -Patient to continue fiber supplement as tolerated. We once again discussed different laxatives and benefits and risks of each\par -Patient follow up as needed.

## 2021-10-08 NOTE — PHYSICAL EXAM
[Sclera] : the sclera and conjunctiva were normal [Extraocular Movements] : extraocular movements were intact [No Focal Deficits] : no focal deficits [FreeTextEntry1] : aox3

## 2021-12-12 ENCOUNTER — APPOINTMENT (OUTPATIENT)
Dept: MRI IMAGING | Facility: CLINIC | Age: 51
End: 2021-12-12

## 2021-12-20 ENCOUNTER — APPOINTMENT (OUTPATIENT)
Dept: PULMONOLOGY | Facility: CLINIC | Age: 51
End: 2021-12-20
Payer: COMMERCIAL

## 2021-12-20 ENCOUNTER — LABORATORY RESULT (OUTPATIENT)
Age: 51
End: 2021-12-20

## 2021-12-20 ENCOUNTER — NON-APPOINTMENT (OUTPATIENT)
Age: 51
End: 2021-12-20

## 2021-12-20 VITALS
BODY MASS INDEX: 24.38 KG/M2 | SYSTOLIC BLOOD PRESSURE: 128 MMHG | TEMPERATURE: 97.5 F | RESPIRATION RATE: 15 BRPM | HEIGHT: 61 IN | WEIGHT: 129.13 LBS | HEART RATE: 82 BPM | OXYGEN SATURATION: 97 % | DIASTOLIC BLOOD PRESSURE: 88 MMHG

## 2021-12-20 PROCEDURE — 99214 OFFICE O/P EST MOD 30 MIN: CPT

## 2021-12-20 RX ORDER — FLUTICASONE PROPIONATE 50 UG/1
50 SPRAY, METERED NASAL TWICE DAILY
Qty: 16 | Refills: 2 | Status: COMPLETED | COMMUNITY
Start: 2020-10-19 | End: 2021-12-20

## 2021-12-20 RX ORDER — TIZANIDINE 2 MG/1
2 TABLET ORAL EVERY 6 HOURS
Qty: 24 | Refills: 0 | Status: COMPLETED | COMMUNITY
Start: 2020-11-09 | End: 2021-12-20

## 2021-12-20 RX ORDER — HYDROCORTISONE ACETATE 25 MG/1
25 SUPPOSITORY RECTAL
Qty: 7 | Refills: 0 | Status: COMPLETED | COMMUNITY
Start: 2021-10-08 | End: 2021-12-20

## 2021-12-20 RX ORDER — ALPRAZOLAM 1 MG/1
1 TABLET ORAL
Refills: 0 | Status: COMPLETED | COMMUNITY
End: 2021-12-20

## 2021-12-20 RX ORDER — GABAPENTIN 300 MG/1
300 CAPSULE ORAL
Refills: 0 | Status: COMPLETED | COMMUNITY
End: 2021-12-20

## 2021-12-20 RX ORDER — LAMOTRIGINE 25 MG/1
25 TABLET ORAL DAILY
Refills: 0 | Status: COMPLETED | COMMUNITY
End: 2021-12-20

## 2021-12-20 RX ORDER — MELOXICAM 15 MG/1
15 TABLET ORAL
Qty: 30 | Refills: 10 | Status: COMPLETED | COMMUNITY
Start: 2021-03-09 | End: 2021-12-20

## 2021-12-20 NOTE — PHYSICAL EXAM
[No Acute Distress] : no acute distress [Well Nourished] : well nourished [Well Groomed] : well groomed [Well Developed] : well developed [Normal Oropharynx] : normal oropharynx [I] : Mallampati Class: I [Normal Appearance] : normal appearance [Supple] : supple [No Neck Mass] : no neck mass [No JVD] : no jvd [Normal Rate/Rhythm] : normal rate/rhythm [Normal Pulses] : normal pulses [Normal S1, S2] : normal s1, s2 [No Murmurs] : no murmurs [No Resp Distress] : no resp distress [No Acc Muscle Use] : no acc muscle use [Clear to Auscultation Bilaterally] : clear to auscultation bilaterally [No Abnormalities] : no abnormalities [Benign] : benign [Not Tender] : not tender [Soft] : soft [Normal Gait] : normal gait [No Clubbing] : no clubbing [No Cyanosis] : no cyanosis [No Edema] : no edema [FROM] : FROM [Normal Color/ Pigmentation] : normal color/ pigmentation [No Focal Deficits] : no focal deficits [Oriented x3] : oriented x3 [Normal Affect] : normal affect

## 2021-12-22 ENCOUNTER — NON-APPOINTMENT (OUTPATIENT)
Age: 51
End: 2021-12-22

## 2021-12-23 ENCOUNTER — NON-APPOINTMENT (OUTPATIENT)
Age: 51
End: 2021-12-23

## 2021-12-26 ENCOUNTER — APPOINTMENT (OUTPATIENT)
Dept: MRI IMAGING | Facility: CLINIC | Age: 51
End: 2021-12-26
Payer: COMMERCIAL

## 2021-12-26 ENCOUNTER — OUTPATIENT (OUTPATIENT)
Dept: OUTPATIENT SERVICES | Facility: HOSPITAL | Age: 51
LOS: 1 days | End: 2021-12-26
Payer: COMMERCIAL

## 2021-12-26 DIAGNOSIS — D18.03 HEMANGIOMA OF INTRA-ABDOMINAL STRUCTURES: ICD-10-CM

## 2021-12-26 DIAGNOSIS — Z98.890 OTHER SPECIFIED POSTPROCEDURAL STATES: Chronic | ICD-10-CM

## 2021-12-26 DIAGNOSIS — Z00.8 ENCOUNTER FOR OTHER GENERAL EXAMINATION: ICD-10-CM

## 2021-12-26 DIAGNOSIS — K76.89 OTHER SPECIFIED DISEASES OF LIVER: ICD-10-CM

## 2021-12-26 PROCEDURE — 74183 MRI ABD W/O CNTR FLWD CNTR: CPT

## 2021-12-26 PROCEDURE — 74183 MRI ABD W/O CNTR FLWD CNTR: CPT | Mod: 26

## 2021-12-26 PROCEDURE — A9585: CPT

## 2021-12-27 ENCOUNTER — APPOINTMENT (OUTPATIENT)
Dept: MRI IMAGING | Facility: CLINIC | Age: 51
End: 2021-12-27

## 2021-12-28 ENCOUNTER — APPOINTMENT (OUTPATIENT)
Dept: MRI IMAGING | Facility: CLINIC | Age: 51
End: 2021-12-28

## 2022-01-02 LAB
A ALTERNATA IGE QN: <0.1 KUA/L
A FUMIGATUS IGE QN: <0.1 KUA/L
BASOPHILS # BLD AUTO: 0.1 K/UL
BASOPHILS NFR BLD AUTO: 1 %
BERMUDA GRASS IGE QN: <0.1 KUA/L
BOXELDER IGE QN: <0.1 KUA/L
C HERBARUM IGE QN: <0.1 KUA/L
CALIF WALNUT IGE QN: <0.1 KUA/L
CAT DANDER IGE QN: <0.1 KUA/L
CMN PIGWEED IGE QN: <0.1 KUA/L
COMMON RAGWEED IGE QN: <0.1 KUA/L
COTTONWOOD IGE QN: <0.1 KUA/L
D FARINAE IGE QN: 1.09 KUA/L
D PTERONYSS IGE QN: 0.63 KUA/L
DEPRECATED A ALTERNATA IGE RAST QL: 0
DEPRECATED A FUMIGATUS IGE RAST QL: 0
DEPRECATED BERMUDA GRASS IGE RAST QL: 0
DEPRECATED BOXELDER IGE RAST QL: 0
DEPRECATED C HERBARUM IGE RAST QL: 0
DEPRECATED CAT DANDER IGE RAST QL: 0
DEPRECATED COMMON PIGWEED IGE RAST QL: 0
DEPRECATED COMMON RAGWEED IGE RAST QL: 0
DEPRECATED COTTONWOOD IGE RAST QL: 0
DEPRECATED D FARINAE IGE RAST QL: 2
DEPRECATED D PTERONYSS IGE RAST QL: 1
DEPRECATED DOG DANDER IGE RAST QL: 0
DEPRECATED GOOSEFOOT IGE RAST QL: 0
DEPRECATED LONDON PLANE IGE RAST QL: 0
DEPRECATED MOUSE URINE PROT IGE RAST QL: 0
DEPRECATED MUGWORT IGE RAST QL: 0
DEPRECATED P NOTATUM IGE RAST QL: 0
DEPRECATED RED CEDAR IGE RAST QL: 0
DEPRECATED ROACH IGE RAST QL: 0
DEPRECATED SHEEP SORREL IGE RAST QL: 0
DEPRECATED SILVER BIRCH IGE RAST QL: 0
DEPRECATED TIMOTHY IGE RAST QL: 2
DEPRECATED WHITE ASH IGE RAST QL: 0
DEPRECATED WHITE OAK IGE RAST QL: 0
DOG DANDER IGE QN: <0.1 KUA/L
EOSINOPHIL # BLD AUTO: 0.28 K/UL
EOSINOPHIL NFR BLD AUTO: 2.9 %
GOOSEFOOT IGE QN: <0.1 KUA/L
HCT VFR BLD CALC: 39.9 %
HGB BLD-MCNC: 12.9 G/DL
IMM GRANULOCYTES NFR BLD AUTO: 0.3 %
LONDON PLANE IGE QN: <0.1 KUA/L
LYMPHOCYTES # BLD AUTO: 2.55 K/UL
LYMPHOCYTES NFR BLD AUTO: 26 %
MAN DIFF?: NORMAL
MCHC RBC-ENTMCNC: 31.5 PG
MCHC RBC-ENTMCNC: 32.3 GM/DL
MCV RBC AUTO: 97.6 FL
MONOCYTES # BLD AUTO: 0.83 K/UL
MONOCYTES NFR BLD AUTO: 8.5 %
MOUSE URINE PROT IGE QN: <0.1 KUA/L
MUGWORT IGE QN: <0.1 KUA/L
MULBERRY (T70) CLASS: 0
MULBERRY (T70) CONC: <0.1 KUA/L
NEUTROPHILS # BLD AUTO: 6.03 K/UL
NEUTROPHILS NFR BLD AUTO: 61.3 %
P NOTATUM IGE QN: <0.1 KUA/L
PLATELET # BLD AUTO: 277 K/UL
RBC # BLD: 4.09 M/UL
RBC # FLD: 13 %
RED CEDAR IGE QN: <0.1 KUA/L
ROACH IGE QN: <0.1 KUA/L
SHEEP SORREL IGE QN: <0.1 KUA/L
SILVER BIRCH IGE QN: <0.1 KUA/L
TIMOTHY IGE QN: 0.74 KUA/L
TOTAL IGE SMQN RAST: 13 KU/L
TREE ALLERG MIX1 IGE QL: 0
WBC # FLD AUTO: 9.82 K/UL
WHITE ASH IGE QN: <0.1 KUA/L
WHITE ELM IGE QN: 0
WHITE ELM IGE QN: <0.1 KUA/L
WHITE OAK IGE QN: <0.1 KUA/L

## 2022-01-02 NOTE — ASSESSMENT
[FreeTextEntry1] : \par Ms. Peña is a 51-year-old female with a history of asthma, HTN, and depression with anxiety who presents for follow-up of her asthma. Recently with worsening cough, dyspnea, and chest tightness after self-discontinuing Symbicort. Asthma triggers include cold, anxiety, and dust. Also with chronic rhinorrhea likely due to allergic rhinitis.\par \par 1. Mild persistent asthma:\par - PFTs (Oct 2020) with normal spirometry without BD response. Suggestion of small airways disease given reduced [id number] that improves significantly with albuterol. Low normal lung volumes. Normal diffusing capacity\par - Clinical history suggestive of asthma, especially given chest tightness and nocturnal symptoms with reported improvement with albuterol\par - Restart Symbicort 80-4.5 mcg 2 puffs twice daily, rinse after use\par - Continue albuterol inhaler prn dyspnea/chest tightness\par - Consider removing carpets at home\par - Labs today with peripheral eosinophilia of 280, serum allergy testing positive for dust and grass, normal IgE (13), and elevated 25-OH Vitamin D (97 --> avoid supplemental vitamin D)\par - Monitor peak flows (personal best today was 360)\par - Continue exercise as tolerates (exercises with  regularly)\par \par 2. Allergic rhinitis:\par - History of skin allergy testing positive for dust mites and alternaria mold\par - Serum allergy testing positive for dust and grass\par - Restart fluticasone nasal spray twice daily\par \par 3. HCM:\par - Follow-up in 2-3 months\par - Declining COVID-19 vaccination\par - Up to date with influenza vaccination

## 2022-01-02 NOTE — HISTORY OF PRESENT ILLNESS
[Never] : never [TextBox_4] : Ms. Peña is a 51-year-old female with a history of asthma, HTN, and depression with anxiety who presents for follow-up of her asthma. She was taking Symbicort with reported improvement in her dyspnea, cough, and chest tightness. She recently stopped the Symbicort 2 months ago because she was worried about her hypertension, although she never experienced palpitations or racing heart beat. She has noticed increase in her dyspnea with inability to take a deep breath, especially in the cold weather. She reports an occasional cough. No significant wheezing, but she does report occasional chest tightness. She wants to hold off on repeat PFTs at this time as she does not want to go for COVID testing. Other triggers for her asthma symptoms include cold, anxiety, dust. She has guinea pigs that do not trigger the asthma. She feeds them, but does not change the cage. No cats or dogs at home. She did not remove her carpets. \par \par PFTs from Oct 2020 with normal spirometry without BD response, although there was suggestion of small airways disease given reduced [id number] that improved significantly with albuterol. Low normal lung volumes. Normal diffusing capacity. Will consider checking CBC with diff, IgE, and serum allergy testing.\par \par She exercises regularly with a . Denies dyspnea with regular exertion, but develops dyspnea during training as expected.\par \par She recently had COVID in March 2020. She went to PMD with symptoms of fever, dyspnea, diarrhea, myalgias, and nausea. She did not require any therapy or supplemental oxygen. Not hospitalized.

## 2022-01-03 ENCOUNTER — TRANSCRIPTION ENCOUNTER (OUTPATIENT)
Age: 52
End: 2022-01-03

## 2022-01-03 LAB — 25(OH)D3 SERPL-MCNC: 97.5 NG/ML

## 2022-03-04 NOTE — H&P PST ADULT - SKIN
"[facility]    Medicine Progress Note - Hospitalist Service    Date of Admission:  2/6/2022    Assessment & Plan   Acute respiratory failure with hypoxia (H)  Presenting with increased dyspnea after testing positive for Covid on home testing at home  Covid test positive in the ED, requiring supplemental oxygen  Slow improvement in oxygen needs, currently at 6-7 L HFNC, but desats easily with any activity  Reviewed with pulmonology, Dr. Cortés, on 2/24/2022  Continue antibiotics, no other changes unless decompensates  Started on Life 2000 device which supplies PEEP, varying with FiO2 at 40%  Especially likes and does well with the 3 settings that he can adjust upward with increased dyspnea and anticipation of needs with increased activity  Getting more confident with breathing, working with PT/OT and feeling better  Continue with current plan, likely home in next few days  3/2/2022-overnight having episodes of nightmares, awoke to have his machine off with sats in the 60s.  Feeling more concerned about his safety at home.  Spoke with  and will explore other options including possible ARU  3/3/2022-Better evening last nite, after long discussion with wife, he and she are looking for discharge home. Awaiting to get assurance for payment of Life 2000 and to get oxygen arranged.   3/4/22- still having difficulties with nights. Currently on NRB mask to \"rest his nose\". Considering skilled nursing facility vs home.     Pneumonia due to 2019 novel coronavirus  Presenting with dyspnea and oxygen requirement  Chest x-ray imaging showing bilateral multifocal pneumonia consistent with Covid-19  Concern with elevated white count of a possible bacterial process,  he had been started on IV antibiotics as well, fairly broad-spectrum and that concern of some underlying hematologic disease, these have been stopped  Has been started on dexamethasone, baricitinib, with Lovenox for clot " prophylaxis  Finished 14 days of Baricitinib, 10 days of decadron   CRP elevated yesterday at 78  White blood cell count trending down, currently at 9800, most likely some underlying CLL which will need outpatient follow-up by oncology  Due to worsening sxs last week, zosyn added, was transitioned to oral augmentin, course finished and was stopped           CLL (chronic lymphocytic leukemia) (H)  Fairly markedly elevated white count, recent history of elevated leukocytosis without clear cause  Concern is a possible underlying hematologic disorder, work-up in progress  Has been seen by oncology with flow cytometry showing probable CLL  will need outpatient follow-up - plan BM biopsy on 3/10                   Diet: Moderate Consistent Carb (60 g CHO per Meal) Diet    DVT Prophylaxis: DOAC  Portillo Catheter: Not present  Central Lines: None  Cardiac Monitoring: None  Code Status: Full Code      Disposition Plan   Expected Discharge:  any day   Anticipated discharge location: home with help/services    Delays:            The patient's care was discussed with the Patient.    Will Brian MD  Hospitalist Service  [facility]  Securely message with the Vocera Web Console (learn more here)  Text page via [employer] Paging/Directory         Clinically Significant Risk Factors Present on Admission                    ______________________________________________________________________    Interval History   Still dyspnea on exertion. Rough night again    Data reviewed today: I reviewed all medications, new labs and imaging results over the last 24 hours. I personally reviewed no images or EKG's today.    Physical Exam   Vital Signs: Temp: 98.6  F (37  C) Temp src: Tympanic BP: 119/73 Pulse: 106   Resp: 24 SpO2: 100 % O2 Device: Non-rebreather mask Oxygen Delivery: 15 LPM  Weight: 182 lbs 0 oz  GENERAL: Comfortable, no apparent distress.  CARDIOVASCULAR: regular rate and rhythm, no murmur. No lower extremity  edema   RESPIRATORY: Clear to auscultation bilaterally, no wheezes or crackles.  GI: non-tender, non-distended, normal bowel sounds.   SKIN: warm periphery, no rashes      Data   Recent Labs   Lab 03/04/22  1144 03/04/22  0811 03/03/22  2107 02/26/22  0751 02/26/22  0513   WBC  --   --   --   --  9.9   HGB  --   --   --   --  10.8*   MCV  --   --   --   --  81   PLT  --   --   --   --  167   NA  --   --   --   --  136   POTASSIUM  --   --   --   --  4.2   CHLORIDE  --   --   --   --  97*   CO2  --   --   --   --  30   BUN  --   --   --   --  10   CR  --   --   --   --  0.85   ANIONGAP  --   --   --   --  9   LILIAN  --   --   --   --  9.1   * 132* 127*   < > 109*    < > = values in this interval not displayed.     Medications     - MEDICATION INSTRUCTIONS -         apixaban ANTICOAGULANT  2.5 mg Oral BID     atorvastatin  40 mg Oral Daily     fluticasone  1 spray Both Nostrils Daily     lisinopril  2.5 mg Oral Daily     multivitamin w/minerals  1 tablet Oral Daily     sodium chloride (PF)  3 mL Intracatheter Q8H     vitamin D2  50,000 Units Oral Q7 Days      warm and dry detailed exam

## 2022-03-28 ENCOUNTER — APPOINTMENT (OUTPATIENT)
Dept: PULMONOLOGY | Facility: CLINIC | Age: 52
End: 2022-03-28
Payer: COMMERCIAL

## 2022-03-28 VITALS
HEART RATE: 74 BPM | OXYGEN SATURATION: 98 % | TEMPERATURE: 98.2 F | HEIGHT: 61 IN | SYSTOLIC BLOOD PRESSURE: 128 MMHG | DIASTOLIC BLOOD PRESSURE: 79 MMHG | RESPIRATION RATE: 16 BRPM

## 2022-03-28 DIAGNOSIS — J30.89 OTHER ALLERGIC RHINITIS: ICD-10-CM

## 2022-03-28 DIAGNOSIS — J45.909 UNSPECIFIED ASTHMA, UNCOMPLICATED: ICD-10-CM

## 2022-03-28 PROCEDURE — 99214 OFFICE O/P EST MOD 30 MIN: CPT

## 2022-03-28 RX ORDER — LOSARTAN POTASSIUM 100 MG/1
100 TABLET, FILM COATED ORAL
Qty: 30 | Refills: 0 | Status: ACTIVE | COMMUNITY
Start: 2022-03-09

## 2022-03-28 RX ORDER — QUINAPRIL HYDROCHLORIDE 5 MG/1
5 TABLET, FILM COATED ORAL
Qty: 90 | Refills: 3 | Status: COMPLETED | COMMUNITY
Start: 2019-10-29 | End: 2022-03-28

## 2022-03-28 RX ORDER — NAPROXEN 500 MG/1
500 TABLET ORAL
Qty: 60 | Refills: 0 | Status: ACTIVE | COMMUNITY
Start: 2022-03-23

## 2022-03-28 RX ORDER — FLUTICASONE PROPIONATE 50 UG/1
50 SPRAY, METERED NASAL TWICE DAILY
Qty: 1 | Refills: 5 | Status: ACTIVE | COMMUNITY
Start: 2021-12-20 | End: 1900-01-01

## 2022-03-29 PROBLEM — J30.89 ALLERGIC RHINITIS DUE TO DUST: Status: ACTIVE | Noted: 2020-10-19

## 2022-03-29 PROBLEM — J45.909 ASTHMA: Status: ACTIVE | Noted: 2017-06-21

## 2022-03-29 NOTE — HISTORY OF PRESENT ILLNESS
[Never] : never [TextBox_4] : Ms. Peña is a 51-year-old female with a history of asthma, HTN, and depression with anxiety who presents for follow-up of her asthma. She was restarted on Symbicort in Dec 2021 given dyspnea with inability to take a deep breath, especially in the cold weather, along with associated cough. She is adherent with Symbicort once daily, sometimes takes twice daily if she is anxious or has a busy day. She reports significant improvement in her dyspnea with Symbicort. She monitor her peak flows, which range from 320 to 360. She does not rinse her mouth after, but I advised her to start. Asthma triggers include cold, anxiety, dust. She has guinea pigs that do not trigger the asthma. She feeds them, but does not change the cage. No cats or dogs at home. She did not remove her carpets. She exercises with a  5 days/week for 1 hour at a time. Cough and allergic rhinitis improved with flonase nasal spray.\par \par Labs (Dec 2021) with peripheral eosinophilia of 280, serum allergy testing positive for dust and grass, normal IgE (13), and elevated 25-OH Vitamin D (97 --> avoid supplemental vitamin D). \par \par PFTs from Oct 2020 with normal spirometry without BD response, although there was suggestion of small airways disease given reduced [id number] that improved significantly with albuterol. Low normal lung volumes. Normal diffusing capacity. Will consider checking CBC with diff, IgE, and serum allergy testing.\par \par She recently had COVID in March 2020. She went to PMD with symptoms of fever, dyspnea, diarrhea, myalgias, and nausea. She did not require any therapy or supplemental oxygen. Not hospitalized.

## 2022-03-29 NOTE — ASSESSMENT
[FreeTextEntry1] : Ms. Peña is a 51-year-old female with a history of asthma, HTN, and depression with anxiety who presents for follow-up of her asthma. She is adherent with Symbicort at least once daily, but sometimes takes twice daily if she is anxious or has a busy day. Asthma triggers include cold, anxiety, and dust. Also with chronic rhinorrhea likely due to allergic rhinitis.\par \par 1. Mild persistent asthma:\par - PFTs (Oct 2020) with normal spirometry without BD response. Suggestion of small airways disease given reduced [id number] that improves significantly with albuterol. Low normal lung volumes. Normal diffusing capacity\par - Clinical history suggestive of asthma, especially given chest tightness and nocturnal symptoms with reported improvement with albuterol\par - Continue Symbicort 80-4.5 mcg 2 puffs twice daily\par - I counseled her regarding rinsing after use to decrease risk of oral thrush, sore throat, or hoarse voice\par - Albuterol inhaler prn dyspnea/chest tightness\par - Consider removing carpets at home\par - Labs (Dec 2021) peripheral eosinophilia of 280, serum allergy testing positive for dust and grass, normal IgE (13), and elevated 25-OH Vitamin D (97 --> avoid supplemental vitamin D)\par - Monitor peak flows (ranges from 320-360)\par - Continue exercise as tolerates (exercises with  regularly)\par \par 2. Allergic rhinitis:\par - History of skin allergy testing positive for dust mites and alternaria mold\par - Serum allergy testing positive for dust and grass\par - Continue fluticasone nasal spray twice daily\par \par 3. HCM:\par - Follow-up in 2-3 months\par - Declining COVID-19 vaccination\par - Up to date with influenza vaccination

## 2022-06-17 ENCOUNTER — APPOINTMENT (OUTPATIENT)
Dept: ORTHOPEDIC SURGERY | Facility: CLINIC | Age: 52
End: 2022-06-17
Payer: COMMERCIAL

## 2022-06-17 VITALS — BODY MASS INDEX: 24.35 KG/M2 | WEIGHT: 129 LBS | HEIGHT: 61 IN

## 2022-06-17 DIAGNOSIS — M17.11 UNILATERAL PRIMARY OSTEOARTHRITIS, RIGHT KNEE: ICD-10-CM

## 2022-06-17 PROCEDURE — 99203 OFFICE O/P NEW LOW 30 MIN: CPT

## 2022-06-17 PROCEDURE — 73562 X-RAY EXAM OF KNEE 3: CPT | Mod: RT

## 2022-06-17 NOTE — DISCUSSION/SUMMARY
[de-identified] : Running will only keep aggravating her knee OA. Should stay with non impact exercises, will try to get authorization for viscoinjections

## 2022-06-17 NOTE — HISTORY OF PRESENT ILLNESS
[Gradual] : gradual [Dull/Aching] : dull/aching [Localized] : localized [de-identified] : 6-17-22- she notes 20+ years ago had right knee arthroscopy did well and returned to activities. Over the last 2+ months has had medially based pain and has not been able to tolerate running.\par \par works as psychotherapist [] : no [FreeTextEntry1] : RT Knee [FreeTextEntry5] : Pt present with right knee pain. No trauma/injury [de-identified] : 20 yrs ago  [de-identified] : none recent

## 2022-06-17 NOTE — PHYSICAL EXAM
[5___] : hamstring 5[unfilled]/5 [Equivocal] : equivocal Arnie [] : negative Lachmann [Right] : right knee [AP] : anteroposterior [Lateral] : lateral [Moderate tricompartmental OA medial narrowing] : Moderate tricompartmental OA medial narrowing [[id number]] : flexion 120 degrees [de-identified] : extension 0 degrees

## 2023-01-18 ENCOUNTER — APPOINTMENT (OUTPATIENT)
Dept: UROGYNECOLOGY | Facility: CLINIC | Age: 53
End: 2023-01-18
Payer: COMMERCIAL

## 2023-01-18 VITALS
HEIGHT: 61 IN | DIASTOLIC BLOOD PRESSURE: 85 MMHG | BODY MASS INDEX: 24.55 KG/M2 | SYSTOLIC BLOOD PRESSURE: 122 MMHG | WEIGHT: 130 LBS

## 2023-01-18 DIAGNOSIS — N32.81 OVERACTIVE BLADDER: ICD-10-CM

## 2023-01-18 LAB
BILIRUB UR QL STRIP: NORMAL
CLARITY UR: CLEAR
COLLECTION METHOD: NORMAL
GLUCOSE UR-MCNC: NORMAL
HCG UR QL: 0.2 EU/DL
HGB UR QL STRIP.AUTO: NORMAL
KETONES UR-MCNC: NORMAL
LEUKOCYTE ESTERASE UR QL STRIP: NORMAL
NITRITE UR QL STRIP: NORMAL
PH UR STRIP: 7
PROT UR STRIP-MCNC: NORMAL
SP GR UR STRIP: 1.01

## 2023-01-18 PROCEDURE — 99203 OFFICE O/P NEW LOW 30 MIN: CPT

## 2023-01-18 PROCEDURE — 81003 URINALYSIS AUTO W/O SCOPE: CPT | Mod: QW

## 2023-01-19 ENCOUNTER — APPOINTMENT (OUTPATIENT)
Dept: PULMONOLOGY | Facility: CLINIC | Age: 53
End: 2023-01-19

## 2023-01-19 ENCOUNTER — NON-APPOINTMENT (OUTPATIENT)
Age: 53
End: 2023-01-19

## 2023-01-19 VITALS
TEMPERATURE: 98.3 F | SYSTOLIC BLOOD PRESSURE: 115 MMHG | RESPIRATION RATE: 17 BRPM | HEART RATE: 71 BPM | HEIGHT: 61 IN | DIASTOLIC BLOOD PRESSURE: 75 MMHG | OXYGEN SATURATION: 98 %

## 2023-01-19 DIAGNOSIS — Z83.49 FAMILY HISTORY OF OTHER ENDOCRINE, NUTRITIONAL AND METABOLIC DISEASES: ICD-10-CM

## 2023-01-19 DIAGNOSIS — Z86.79 PERSONAL HISTORY OF OTHER DISEASES OF THE CIRCULATORY SYSTEM: ICD-10-CM

## 2023-01-19 DIAGNOSIS — Z78.9 OTHER SPECIFIED HEALTH STATUS: ICD-10-CM

## 2023-01-19 RX ORDER — UBIDECARENONE/VIT E ACET 100MG-5
CAPSULE ORAL
Refills: 0 | Status: ACTIVE | COMMUNITY

## 2023-01-19 RX ORDER — PERPHENAZINE 2 MG
TABLET ORAL
Refills: 0 | Status: ACTIVE | COMMUNITY

## 2023-01-19 RX ORDER — MILK THISTLE 150 MG
CAPSULE ORAL
Refills: 0 | Status: ACTIVE | COMMUNITY

## 2023-01-19 RX ORDER — MULTIVIT-MIN/IRON/FOLIC ACID/K 18-600-40
CAPSULE ORAL
Refills: 0 | Status: ACTIVE | COMMUNITY

## 2023-01-19 RX ORDER — OMEGA-3/DHA/EPA/FISH OIL 300-1000MG
CAPSULE ORAL
Refills: 0 | Status: ACTIVE | COMMUNITY

## 2023-01-19 RX ORDER — HYDROCHLOROTHIAZIDE 12.5 MG/1
TABLET ORAL
Refills: 0 | Status: ACTIVE | COMMUNITY

## 2023-01-19 RX ORDER — ALBUTEROL SULFATE 90 UG/1
108 (90 BASE) INHALANT RESPIRATORY (INHALATION)
Qty: 1 | Refills: 5 | Status: ACTIVE | COMMUNITY
Start: 1900-01-01 | End: 1900-01-01

## 2023-01-19 RX ORDER — ZINC SULFATE 50(220)MG
CAPSULE ORAL
Refills: 0 | Status: ACTIVE | COMMUNITY

## 2023-01-19 RX ORDER — MELATONIN 3 MG
TABLET ORAL
Refills: 0 | Status: ACTIVE | COMMUNITY

## 2023-01-19 NOTE — HISTORY OF PRESENT ILLNESS
[Unable To Restrain Bowel Movement] : no [Urinary Frequency] : no [Pain During Urination (Dysuria)] : no [Urinary Tract Infection] : no [Hematuria] : no [x3+] : nocturia three or more  times a night [de-identified] : 10x per day for years [FreeTextEntry1] : Presents for urinary frequency. Reports voiding 10x per day and 2-3 times per night. Denies urge incontinence. Drinks 1 cup of coffee in the morning which worsens her symptoms. Drinks 6-8 seltzers all day long, reports she does not like water. She was treated with vaginal laser surgery a few times which she thinks helped her symptoms mildly. \par \par PMSH: asthma, HTN\par Meds: Amlodpine, Losartan, HCTZ, OCPs, Symbicort inhaler\par OBhx: NSVDx4

## 2023-01-19 NOTE — DISCUSSION/SUMMARY
[FreeTextEntry1] : #overactive bladder\par  We discussed first line therapies, including Kegel's exercises and behavioral modification. We discussed medications. We also  discussed second and third line therapies, including the InterStim procedure, Botox, and PTNS.\par Based on her history she drinks 6 to 8 glasses cans of seltzer a day and 1 cup of coffee and we discussed starting with behavioral modification techniques and completing a 3-day bladder diary.\par - Patient instructions provided regarding OAB, bladder diary. \par - Patient to follow up in 1 month \par All questions were answered.

## 2023-01-19 NOTE — HISTORY OF PRESENT ILLNESS
[TextBox_4] : Ms. Peña is a 52-year-old female with a history of asthma, HTN, and depression with anxiety who presents for follow-up of her asthma. She remains on Symbicort 80-4.5 mcg 2 puffs twice daily, rinses after use. Also uses albuterol inhaler prn. Previous labs in Dec 2021 with mild peripheral eosinophilia up to 280 with serum allergy testing positive for dust and grass. IgE normal. Her 25-OH vitamin D level was elevated and she was counseled to stop supplemental Vitamin D. Peak flows fange from 320-360. Asthma triggers include cold, anxiety, dust. She has guinea pigs that do not trigger the asthma. She feeds them, but does not change the cage. No cats or dogs at home. She did not remove her carpets. She exercises with a  5 days/week for 1 hour at a time. \par \par She has allergic rhinitis for which she takes fluticasone nasal spray. History of skin allergy testing positive for dust mites and alternaria mold and serum allergy testing positive for dust and grass. Cough and allergic rhinitis improved with flonase nasal spray.\par \par Labs (Dec 2021) with peripheral eosinophilia of 280, serum allergy testing positive for dust and grass, normal IgE (13), and elevated 25-OH Vitamin D (97 --> avoid supplemental vitamin D). \par \par PFTs from Oct 2020 with normal spirometry without BD response, although there was suggestion of small airways disease given reduced [id number] that improved significantly with albuterol. Low normal lung volumes. Normal diffusing capacity. Will consider checking CBC with diff, IgE, and serum allergy testing.\par \par She previously had COVID in March 2020. She went to PMD with symptoms of fever, dyspnea, diarrhea, myalgias, and nausea. She did not require any therapy or supplemental oxygen. Not hospitalized.

## 2023-01-19 NOTE — ASSESSMENT
[FreeTextEntry1] : Ms. Peña is a 52-year-old female with a history of asthma, HTN, and depression with anxiety who presents for follow-up of her asthma. She is adherent with Symbicort at least once daily, but sometimes takes twice daily if she is anxious or has a busy day. Asthma triggers include cold, anxiety, and dust. Also with chronic rhinorrhea likely due to allergic rhinitis.\par \par 1. Mild persistent asthma:\par - PFTs (Oct 2020) with normal spirometry without BD response. Suggestion of small airways disease given reduced [id number] that improves significantly with albuterol. Low normal lung volumes. Normal diffusing capacity\par - Clinical history suggestive of asthma, especially given chest tightness and nocturnal symptoms with reported improvement with albuterol\par - Continue Symbicort 80-4.5 mcg 2 puffs twice daily\par - I counseled her regarding rinsing after use to decrease risk of oral thrush, sore throat, or hoarse voice\par - Albuterol inhaler prn dyspnea/chest tightness\par - Consider removing carpets at home\par - Labs (Dec 2021) peripheral eosinophilia of 280, serum allergy testing positive for dust and grass, normal IgE (13), and elevated 25-OH Vitamin D (97 --> avoid supplemental vitamin D)\par - Monitor peak flows (ranges from 320-360)\par - Continue exercise as tolerates (exercises with  regularly)\par \par 2. Allergic rhinitis:\par - History of skin allergy testing positive for dust mites and alternaria mold\par - Serum allergy testing positive for dust and grass\par - Continue fluticasone nasal spray twice daily\par \par 3. HCM:\par - Follow-up in 2-3 months\par - Declining COVID-19 vaccination\par - Up to date with influenza vaccination

## 2023-02-10 ENCOUNTER — APPOINTMENT (OUTPATIENT)
Dept: PULMONOLOGY | Facility: CLINIC | Age: 53
End: 2023-02-10

## 2023-04-20 ENCOUNTER — APPOINTMENT (OUTPATIENT)
Dept: ORTHOPEDIC SURGERY | Facility: CLINIC | Age: 53
End: 2023-04-20
Payer: COMMERCIAL

## 2023-04-20 DIAGNOSIS — M77.8 OTHER ENTHESOPATHIES, NOT ELSEWHERE CLASSIFIED: ICD-10-CM

## 2023-04-20 PROCEDURE — 73140 X-RAY EXAM OF FINGER(S): CPT | Mod: RT

## 2023-04-20 PROCEDURE — 99214 OFFICE O/P EST MOD 30 MIN: CPT

## 2023-04-20 NOTE — IMAGING
[de-identified] : RIGHT HAND\par skin intact. mild swelling and erythema over thumb dorsal IPJ.\par TTP to thumb IPJ.\par thumb ROM deferred.\par SILT to median, ulnar, radial distribution. \par brisk cap refill all digits.\par \par \par XRAYS OF RIGHT THUMB: no acute displaced fracture or dislocation. calcification dorsal to IPJ.

## 2023-04-20 NOTE — ASSESSMENT
[FreeTextEntry1] : The condition was explained to the patient.\par Advised patient to hold off on antibiotics at this time. \par - prescribed MDP. do not take with other NSAIDs, can take OTC NSAIDs after completing MDP. \par - activity modification PRN.\par \par F/u 1 week. \par Discussed signs/symptoms of infection that would warrant urgent evaluation in office/ER - worsening pain, redness, wound drainage, fever, chills, systemic ill symptoms.

## 2023-04-20 NOTE — HISTORY OF PRESENT ILLNESS
[de-identified] : 4/20/23: 53yo female presents for RIGHT thumb. Woke up with pain, redness, swelling yesterday. Denies f/c or systemic ill symptoms.\par Saw PCP => Rx Doxycycline and Clindamycin (has not picked up prescription yet), referred here.\par \par Hx: asthma.  HTN.\par s/p RIGHT CTR (Dr. Franz 2021)

## 2023-04-27 ENCOUNTER — APPOINTMENT (OUTPATIENT)
Dept: ORTHOPEDIC SURGERY | Facility: CLINIC | Age: 53
End: 2023-04-27

## 2023-10-01 PROBLEM — L81.2 EPHELIDES: Status: ACTIVE | Noted: 2019-02-20

## 2024-01-04 RX ORDER — BUDESONIDE AND FORMOTEROL FUMARATE DIHYDRATE 80; 4.5 UG/1; UG/1
80-4.5 AEROSOL RESPIRATORY (INHALATION) TWICE DAILY
Qty: 1 | Refills: 0 | Status: ACTIVE | COMMUNITY
Start: 2024-01-04 | End: 1900-01-01

## 2024-01-04 RX ORDER — BUDESONIDE AND FORMOTEROL FUMARATE DIHYDRATE 80; 4.5 UG/1; UG/1
80-4.5 AEROSOL RESPIRATORY (INHALATION) TWICE DAILY
Qty: 1 | Refills: 5 | Status: DISCONTINUED | COMMUNITY
Start: 2020-10-19 | End: 2024-01-04

## 2024-01-04 RX ORDER — FLUTICASONE FUROATE AND VILANTEROL TRIFENATATE 100; 25 UG/1; UG/1
100-25 POWDER RESPIRATORY (INHALATION)
Qty: 1 | Refills: 6 | Status: DISCONTINUED | COMMUNITY
Start: 2024-01-04 | End: 2024-01-04

## 2024-08-08 ENCOUNTER — APPOINTMENT (OUTPATIENT)
Dept: SURGERY | Facility: CLINIC | Age: 54
End: 2024-08-08

## 2024-08-08 PROBLEM — R22.1 MASS OF NECK: Status: ACTIVE | Noted: 2024-08-08

## 2024-08-08 PROCEDURE — 99203 OFFICE O/P NEW LOW 30 MIN: CPT

## 2024-08-08 NOTE — CONSULT LETTER
[Dear  ___] : Dear  [unfilled], [Consult Letter:] : I had the pleasure of evaluating your patient, [unfilled]. [Please see my note below.] : Please see my note below. [Consult Closing:] : Thank you very much for allowing me to participate in the care of this patient.  If you have any questions, please do not hesitate to contact me. [Sincerely,] : Sincerely, [FreeTextEntry2] : Dr. Reinaldo Malin, Dr. Ace Barry [FreeTextEntry3] : Alberto Stevens MD, FACS System Director, Endocrine Surgery [facility] Associate  Professor of Surgery [employer] [employer] [employer] at [facility] [DrHannah  ___] : Dr. BEJARANO

## 2024-08-08 NOTE — PHYSICAL EXAM
[de-identified] : 4 cm right upper jugular mass, soft, mobile at tail of parotid [de-identified] : no palpable thyroid nodules [Midline] : located in midline position [Normal] : orientation to person, place, and time: normal

## 2024-08-08 NOTE — HISTORY OF PRESENT ILLNESS
[de-identified] : Pt c/o right neck mass for several months.  denies dry mouth, dental or scalp infections, dysphagia or RT exposure, fever, night sweats, skin cancer excision or weight change.  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

## 2024-08-08 NOTE — ASSESSMENT
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 discussed options for management including observation vs excision. risks, benefits and alternatives discussed at length.  I have discussed with the patient the anatomy of the area, the pathophysiology of the disease process and the rationale for surgery.  The attendant risks, possible complications and expected postoperative course have been discussed in detail.  I have given the patient the opportunity to ask questions, and all questions have been answered to the patient's satisfaction, and they wish to proceed with the planned procedure.   requested MRI to assess if within or external to parotid capsule. to call next week for results. risk of nerve injury and recurrence and scarring discussed.

## 2024-08-13 ENCOUNTER — APPOINTMENT (OUTPATIENT)
Dept: MRI IMAGING | Facility: CLINIC | Age: 54
End: 2024-08-13

## 2024-08-13 PROCEDURE — 70540 MRI ORBIT/FACE/NECK W/O DYE: CPT

## 2024-09-03 ENCOUNTER — OUTPATIENT (OUTPATIENT)
Dept: OUTPATIENT SERVICES | Facility: HOSPITAL | Age: 54
LOS: 1 days | End: 2024-09-03

## 2024-09-03 VITALS
DIASTOLIC BLOOD PRESSURE: 92 MMHG | HEART RATE: 79 BPM | OXYGEN SATURATION: 99 % | WEIGHT: 132.06 LBS | RESPIRATION RATE: 16 BRPM | HEIGHT: 60 IN | TEMPERATURE: 98 F | SYSTOLIC BLOOD PRESSURE: 146 MMHG

## 2024-09-03 DIAGNOSIS — R22.1 LOCALIZED SWELLING, MASS AND LUMP, NECK: ICD-10-CM

## 2024-09-03 DIAGNOSIS — M54.12 RADICULOPATHY, CERVICAL REGION: Chronic | ICD-10-CM

## 2024-09-03 DIAGNOSIS — Z98.890 OTHER SPECIFIED POSTPROCEDURAL STATES: Chronic | ICD-10-CM

## 2024-09-03 DIAGNOSIS — M19.90 UNSPECIFIED OSTEOARTHRITIS, UNSPECIFIED SITE: Chronic | ICD-10-CM

## 2024-09-03 DIAGNOSIS — I10 ESSENTIAL (PRIMARY) HYPERTENSION: ICD-10-CM

## 2024-09-03 DIAGNOSIS — F41.9 ANXIETY DISORDER, UNSPECIFIED: Chronic | ICD-10-CM

## 2024-09-03 DIAGNOSIS — Z98.82 BREAST IMPLANT STATUS: Chronic | ICD-10-CM

## 2024-09-03 DIAGNOSIS — K11.8 OTHER DISEASES OF SALIVARY GLANDS: ICD-10-CM

## 2024-09-03 LAB
ANION GAP SERPL CALC-SCNC: 15 MMOL/L — HIGH (ref 7–14)
BUN SERPL-MCNC: 15 MG/DL — SIGNIFICANT CHANGE UP (ref 7–23)
CALCIUM SERPL-MCNC: 8.7 MG/DL — SIGNIFICANT CHANGE UP (ref 8.4–10.5)
CHLORIDE SERPL-SCNC: 104 MMOL/L — SIGNIFICANT CHANGE UP (ref 98–107)
CO2 SERPL-SCNC: 23 MMOL/L — SIGNIFICANT CHANGE UP (ref 22–31)
CREAT SERPL-MCNC: 0.72 MG/DL — SIGNIFICANT CHANGE UP (ref 0.5–1.3)
EGFR: 98 ML/MIN/1.73M2 — SIGNIFICANT CHANGE UP
GLUCOSE SERPL-MCNC: 89 MG/DL — SIGNIFICANT CHANGE UP (ref 70–99)
HCG SERPL-ACNC: <1 MIU/ML — SIGNIFICANT CHANGE UP
POTASSIUM SERPL-MCNC: 3.6 MMOL/L — SIGNIFICANT CHANGE UP (ref 3.5–5.3)
POTASSIUM SERPL-SCNC: 3.6 MMOL/L — SIGNIFICANT CHANGE UP (ref 3.5–5.3)
SODIUM SERPL-SCNC: 142 MMOL/L — SIGNIFICANT CHANGE UP (ref 135–145)

## 2024-09-03 NOTE — H&P PST ADULT - PROBLEM SELECTOR PLAN 3
Patient eligible for deja risk screen age>75? NO    Health care proxy paperwork given to patient? Yes (all patients should be given the packet to fill out at home and return on day of surgery to pre-op RN)    Impaired mobility (ie: uses cane, walker, wheelchair, or assist device)?    Known dementia diagnosis?    Impaired functional status (METS<4)?    Malnutrition BMI<20?

## 2024-09-03 NOTE — H&P PST ADULT - PROBLEM SELECTOR PLAN 1
Patient tentatively scheduled for excision of parotid gland     Pre-op instructions provided.  Famotidine provided with instructions. Hibiclens provided with instructions and was signed by patient. Teach-back method was utilized to assess patient's understanding. Patient verbalized understanding.    ordered BMP(diuretic) , HCG Patient tentatively scheduled for excision of parotid gland     Pre-op instructions provided.  Famotidine provided with instructions. Hibiclens provided with instructions and was signed by patient. Teach-back method was utilized to assess patient's understanding. Patient verbalized understanding.    ordered BMP(pt on diuretic) , HCG

## 2024-09-03 NOTE — H&P PST ADULT - HISTORY OF PRESENT ILLNESS
Pt is a 54 yr old female scheduled for Pt c/o right neck mass for several months. patient presents to PST with preop dx of localized swelling mass and lump the nec, patient is scheuled for parotid tumor excision  Pt is a 54 yr old c/o right neck mass for several months, patient is scheduled for parotid tumor excision

## 2024-09-03 NOTE — H&P PST ADULT - BP NONINVASIVE MEAN (MM HG)
[No CVA Tenderness] : no CVA  tenderness [Normal] : affect was normal and insight and judgment were intact [de-identified] : altered gait 110

## 2024-09-03 NOTE — H&P PST ADULT - NSICDXPASTMEDICALHX_GEN_ALL_CORE_FT
PAST MEDICAL HISTORY:  Anxiety     Anxiety and depression     Asthma     Carpal tunnel syndrome right    COVID-19     HTN (hypertension)     OAB (overactive bladder)     Osteoarthritis     Parotid mass

## 2024-09-03 NOTE — H&P PST ADULT - GAIT/BALANCE
steady Complex Repair And Flap Additional Text (Will Appearing After The Standard Complex Repair Text): The complex repair was not sufficient to completely close the primary defect. The remaining additional defect was repaired with the flap mentioned below.

## 2024-09-03 NOTE — H&P PST ADULT - MS GEN HX ROS MEA POS PC
arthralgia/arthritis/joint pain/stiffness/neck pain right knee pain/arthralgia/arthritis/joint pain/stiffness/neck pain

## 2024-09-03 NOTE — H&P PST ADULT - PROBLEM SELECTOR PLAN 2
Patient instructed to take amlodipine  with a sip of water on the morning of procedure. Patient instructed to take Hctz  with a sip of water on the morning of procedure.

## 2024-09-03 NOTE — H&P PST ADULT - NSICDXPASTSURGICALHX_GEN_ALL_CORE_FT
PAST SURGICAL HISTORY:  H/O arthroscopy b/l knee    H/O breast implant     H/O carpal tunnel repair

## 2024-09-12 ENCOUNTER — TRANSCRIPTION ENCOUNTER (OUTPATIENT)
Age: 54
End: 2024-09-12

## 2024-09-13 ENCOUNTER — APPOINTMENT (OUTPATIENT)
Dept: SURGERY | Facility: HOSPITAL | Age: 54
End: 2024-09-13
Payer: COMMERCIAL

## 2024-09-13 ENCOUNTER — OUTPATIENT (OUTPATIENT)
Dept: OUTPATIENT SERVICES | Facility: HOSPITAL | Age: 54
LOS: 1 days | Discharge: HOME | End: 2024-09-13
Payer: COMMERCIAL

## 2024-09-13 ENCOUNTER — RESULT REVIEW (OUTPATIENT)
Age: 54
End: 2024-09-13

## 2024-09-13 ENCOUNTER — TRANSCRIPTION ENCOUNTER (OUTPATIENT)
Age: 54
End: 2024-09-13

## 2024-09-13 VITALS
HEART RATE: 74 BPM | TEMPERATURE: 98 F | RESPIRATION RATE: 14 BRPM | OXYGEN SATURATION: 100 % | DIASTOLIC BLOOD PRESSURE: 85 MMHG | SYSTOLIC BLOOD PRESSURE: 120 MMHG

## 2024-09-13 VITALS
DIASTOLIC BLOOD PRESSURE: 94 MMHG | SYSTOLIC BLOOD PRESSURE: 135 MMHG | HEART RATE: 76 BPM | HEIGHT: 60 IN | TEMPERATURE: 98 F | OXYGEN SATURATION: 98 % | WEIGHT: 132.06 LBS | RESPIRATION RATE: 16 BRPM

## 2024-09-13 DIAGNOSIS — Z98.890 OTHER SPECIFIED POSTPROCEDURAL STATES: Chronic | ICD-10-CM

## 2024-09-13 DIAGNOSIS — Z98.82 BREAST IMPLANT STATUS: Chronic | ICD-10-CM

## 2024-09-13 DIAGNOSIS — R22.1 LOCALIZED SWELLING, MASS AND LUMP, NECK: ICD-10-CM

## 2024-09-13 PROCEDURE — 42410 EXCISE PAROTID GLAND/LESION: CPT | Mod: RT

## 2024-09-13 PROCEDURE — 88307 TISSUE EXAM BY PATHOLOGIST: CPT | Mod: 26

## 2024-09-13 RX ORDER — HYDROCHLOROTHIAZIDE 12.5 MG/1
1 CAPSULE ORAL
Refills: 0 | DISCHARGE

## 2024-09-13 RX ORDER — ACETAMINOPHEN 325 MG/1
1000 TABLET ORAL EVERY 6 HOURS
Refills: 0 | Status: ACTIVE | OUTPATIENT
Start: 2024-09-13 | End: 2025-08-12

## 2024-09-13 RX ORDER — ACETAMINOPHEN 325 MG/1
2 TABLET ORAL
Qty: 0 | Refills: 0 | DISCHARGE
Start: 2024-09-13

## 2024-09-13 RX ORDER — FAMOTIDINE 10 MG/ML
1 INJECTION INTRAVENOUS
Refills: 0 | DISCHARGE

## 2024-09-13 RX ORDER — BUDESONIDE AND FORMOTEROL FUMARATE 80; 4.5 UG/1; UG/1
2 AEROSOL, METERED RESPIRATORY (INHALATION)
Refills: 0 | DISCHARGE

## 2024-09-13 NOTE — ASU DISCHARGE PLAN (ADULT/PEDIATRIC) - CARE PROVIDER_API CALL
Hilda Bellaire  Surgery  [address] [place] [address] [address]  [place] [address]  Phone: [phone]  Fax: [phone]  Established Patient  Follow Up Time:

## 2024-09-13 NOTE — ASU PREOPERATIVE ASSESSMENT, ADULT (IPARK ONLY) - BP NONINVASIVE SYSTOLIC (MM HG)
I have personally performed a face to face diagnostic evaluation on this patient. I have reviewed the ACP note and agree with the history, exam and plan of care, except as noted. 135

## 2024-09-13 NOTE — ASU PREOPERATIVE ASSESSMENT, ADULT (IPARK ONLY) - FALL HARM RISK - TYPE OF ASSESSMENT
How Severe Is Your Skin Lesion?: mild Has Your Skin Lesion Been Treated?: not been treated Is This A New Presentation, Or A Follow-Up?: Skin Lesion Admission

## 2024-09-13 NOTE — ASU PREOPERATIVE ASSESSMENT, ADULT (IPARK ONLY) - FALL HARM RISK - UNIVERSAL INTERVENTIONS
Bed in lowest position, wheels locked, appropriate side rails in place/Call bell, personal items and telephone in reach/Instruct patient to call for assistance before getting out of bed or chair/Non-slip footwear when patient is out of bed/[place] to call system/Physically safe environment - no spills, clutter or unnecessary equipment/Purposeful Proactive Rounding/Room/bathroom lighting operational, light cord in reach

## 2024-09-18 LAB — SURGICAL PATHOLOGY STUDY: SIGNIFICANT CHANGE UP

## 2024-09-26 ENCOUNTER — APPOINTMENT (OUTPATIENT)
Dept: SURGERY | Facility: CLINIC | Age: 54
End: 2024-09-26
Payer: COMMERCIAL

## 2024-09-26 DIAGNOSIS — R22.1 LOCALIZED SWELLING, MASS AND LUMP, NECK: ICD-10-CM

## 2024-09-26 PROBLEM — J45.909 UNSPECIFIED ASTHMA, UNCOMPLICATED: Chronic | Status: ACTIVE | Noted: 2024-09-03

## 2024-09-26 PROBLEM — K11.8 OTHER DISEASES OF SALIVARY GLANDS: Chronic | Status: ACTIVE | Noted: 2024-09-03

## 2024-09-26 PROBLEM — M19.90 UNSPECIFIED OSTEOARTHRITIS, UNSPECIFIED SITE: Chronic | Status: ACTIVE | Noted: 2024-09-03

## 2024-09-26 PROBLEM — N32.81 OVERACTIVE BLADDER: Chronic | Status: ACTIVE | Noted: 2024-09-03

## 2024-09-26 PROBLEM — F41.9 ANXIETY DISORDER, UNSPECIFIED: Chronic | Status: ACTIVE | Noted: 2024-09-03

## 2024-09-26 PROCEDURE — 99024 POSTOP FOLLOW-UP VISIT: CPT

## 2024-09-26 NOTE — PHYSICAL EXAM
[de-identified] : well healed scar [Midline] : located in midline position [Normal] : orientation to person, place, and time: normal

## 2025-01-10 NOTE — ASU DISCHARGE PLAN (ADULT/PEDIATRIC) - B. DRINK ALCOHOL, BEER, OR WINE
Prothrombin gene mutation  Patient's mother reports that he was on an antiplatelet prior but following with hematology and they recommended to stay only on baby aspirin    Statement Selected

## 2025-01-28 ENCOUNTER — APPOINTMENT (OUTPATIENT)
Dept: SURGERY | Facility: CLINIC | Age: 55
End: 2025-01-28

## 2025-02-18 ENCOUNTER — APPOINTMENT (OUTPATIENT)
Dept: OTOLARYNGOLOGY | Facility: CLINIC | Age: 55
End: 2025-02-18
Payer: COMMERCIAL

## 2025-02-18 VITALS
BODY MASS INDEX: 26.06 KG/M2 | DIASTOLIC BLOOD PRESSURE: 85 MMHG | HEART RATE: 72 BPM | WEIGHT: 138 LBS | SYSTOLIC BLOOD PRESSURE: 133 MMHG | HEIGHT: 61 IN

## 2025-02-18 DIAGNOSIS — H69.93 UNSPECIFIED EUSTACHIAN TUBE DISORDER, BILATERAL: ICD-10-CM

## 2025-02-18 DIAGNOSIS — H81.10 BENIGN PAROXYSMAL VERTIGO, UNSPECIFIED EAR: ICD-10-CM

## 2025-02-18 PROCEDURE — 92557 COMPREHENSIVE HEARING TEST: CPT

## 2025-02-18 PROCEDURE — 92567 TYMPANOMETRY: CPT

## 2025-02-18 PROCEDURE — 99204 OFFICE O/P NEW MOD 45 MIN: CPT

## (undated) DEVICE — VENODYNE/SCD SLEEVE CALF LARGE

## (undated) DEVICE — DRSG MASTISOL

## (undated) DEVICE — PREP BETADINE KIT

## (undated) DEVICE — LABELS BLANK W PEN

## (undated) DEVICE — SUT VICRYL PLUS 2-0 18" TIES UNDYED

## (undated) DEVICE — ELCTR BOVIE TIP BLADE INSULATED 2.75" EDGE

## (undated) DEVICE — DRSG TELFA 3 X 8

## (undated) DEVICE — BIPOLAR FORCEP CORD WECK STANDARD 12FT

## (undated) DEVICE — SUT CHROMIC 3-0 27" SH

## (undated) DEVICE — SUT ETHILON 5-0 18" P-3

## (undated) DEVICE — SUT SILK 2-0 18" FS

## (undated) DEVICE — SUT VICRYL 3-0 18" TIES UNDYED

## (undated) DEVICE — COTTONBALL LG

## (undated) DEVICE — ELCTR ROCKER SWITCH PENCIL BLUE 10FT

## (undated) DEVICE — SOL IRR POUR NS 0.9% 500ML

## (undated) DEVICE — SUT MONOCRYL 4-0 27" PS-2 UNDYED

## (undated) DEVICE — DRAIN RESERVOIR FOR JACKSON PRATT 100CC CARDINAL

## (undated) DEVICE — GLV 7 PROTEXIS (WHITE)

## (undated) DEVICE — WARMING BLANKET FULL UNDERBODY

## (undated) DEVICE — DRSG STERISTRIPS 0.5 X 4"

## (undated) DEVICE — POSITIONER FOAM EGG CRATE ULNAR 2PCS (PINK)

## (undated) DEVICE — PACK HEAD & NECK

## (undated) DEVICE — DRAPE 3/4 SHEET 52X76"

## (undated) DEVICE — BRA JAW

## (undated) DEVICE — ELCTR GROUNDING PAD ADULT COVIDIEN